# Patient Record
Sex: FEMALE | Race: WHITE | NOT HISPANIC OR LATINO | URBAN - METROPOLITAN AREA
[De-identification: names, ages, dates, MRNs, and addresses within clinical notes are randomized per-mention and may not be internally consistent; named-entity substitution may affect disease eponyms.]

---

## 2017-04-03 ENCOUNTER — OFFICE (AMBULATORY)
Dept: URBAN - METROPOLITAN AREA CLINIC 2 | Facility: CLINIC | Age: 72
End: 2017-04-03

## 2017-04-03 VITALS
HEART RATE: 76 BPM | WEIGHT: 126 LBS | SYSTOLIC BLOOD PRESSURE: 128 MMHG | HEIGHT: 59 IN | DIASTOLIC BLOOD PRESSURE: 90 MMHG

## 2017-04-03 DIAGNOSIS — K59.00 CONSTIPATION, UNSPECIFIED: ICD-10-CM

## 2017-04-03 DIAGNOSIS — K92.1 MELENA: ICD-10-CM

## 2017-04-03 DIAGNOSIS — K64.9 UNSPECIFIED HEMORRHOIDS: ICD-10-CM

## 2017-04-03 DIAGNOSIS — T40.2X5A ADVERSE EFFECT OF OTHER OPIOIDS, INITIAL ENCOUNTER: ICD-10-CM

## 2017-04-03 DIAGNOSIS — R19.4 CHANGE IN BOWEL HABIT: ICD-10-CM

## 2017-04-03 PROCEDURE — 99214 OFFICE O/P EST MOD 30 MIN: CPT

## 2017-04-03 RX ORDER — POLYETHYLENE GLYCOL 3350 17 G/17G
POWDER, FOR SOLUTION ORAL
Qty: 765 | Refills: 11 | Status: ACTIVE

## 2017-05-11 VITALS
RESPIRATION RATE: 17 BRPM | DIASTOLIC BLOOD PRESSURE: 57 MMHG | HEART RATE: 61 BPM | TEMPERATURE: 96.8 F | DIASTOLIC BLOOD PRESSURE: 68 MMHG | HEART RATE: 63 BPM | DIASTOLIC BLOOD PRESSURE: 72 MMHG | SYSTOLIC BLOOD PRESSURE: 119 MMHG | SYSTOLIC BLOOD PRESSURE: 103 MMHG | RESPIRATION RATE: 20 BRPM | SYSTOLIC BLOOD PRESSURE: 153 MMHG | DIASTOLIC BLOOD PRESSURE: 89 MMHG | OXYGEN SATURATION: 99 % | HEART RATE: 69 BPM | SYSTOLIC BLOOD PRESSURE: 136 MMHG | HEIGHT: 59 IN | HEART RATE: 74 BPM | SYSTOLIC BLOOD PRESSURE: 144 MMHG | DIASTOLIC BLOOD PRESSURE: 59 MMHG | RESPIRATION RATE: 14 BRPM | SYSTOLIC BLOOD PRESSURE: 105 MMHG | OXYGEN SATURATION: 98 % | HEART RATE: 64 BPM | RESPIRATION RATE: 23 BRPM | HEART RATE: 82 BPM | TEMPERATURE: 99.1 F | SYSTOLIC BLOOD PRESSURE: 110 MMHG | SYSTOLIC BLOOD PRESSURE: 179 MMHG | WEIGHT: 126 LBS | HEART RATE: 66 BPM | HEART RATE: 85 BPM | DIASTOLIC BLOOD PRESSURE: 76 MMHG | HEART RATE: 65 BPM | SYSTOLIC BLOOD PRESSURE: 115 MMHG | DIASTOLIC BLOOD PRESSURE: 92 MMHG | RESPIRATION RATE: 18 BRPM | OXYGEN SATURATION: 100 %

## 2017-05-12 ENCOUNTER — OFFICE (AMBULATORY)
Dept: URBAN - METROPOLITAN AREA CLINIC 64 | Facility: CLINIC | Age: 72
End: 2017-05-12
Payer: COMMERCIAL

## 2017-05-12 ENCOUNTER — AMBULATORY SURGICAL CENTER (AMBULATORY)
Dept: URBAN - METROPOLITAN AREA SURGERY 17 | Facility: SURGERY | Age: 72
End: 2017-05-12
Payer: COMMERCIAL

## 2017-05-12 DIAGNOSIS — K63.89 OTHER SPECIFIED DISEASES OF INTESTINE: ICD-10-CM

## 2017-05-12 DIAGNOSIS — K59.00 CONSTIPATION, UNSPECIFIED: ICD-10-CM

## 2017-05-12 DIAGNOSIS — R19.7 DIARRHEA, UNSPECIFIED: ICD-10-CM

## 2017-05-12 DIAGNOSIS — K58.9 IRRITABLE BOWEL SYNDROME WITHOUT DIARRHEA: ICD-10-CM

## 2017-05-12 DIAGNOSIS — K64.9 UNSPECIFIED HEMORRHOIDS: ICD-10-CM

## 2017-05-12 DIAGNOSIS — R19.4 CHANGE IN BOWEL HABIT: ICD-10-CM

## 2017-05-12 LAB
GI HISTOLOGY: A. SELECT: (no result)
GI HISTOLOGY: B. SELECT: (no result)
GI HISTOLOGY: PDF REPORT: (no result)

## 2017-05-12 PROCEDURE — 88305 TISSUE EXAM BY PATHOLOGIST: CPT | Performed by: INTERNAL MEDICINE

## 2017-05-12 PROCEDURE — 45380 COLONOSCOPY AND BIOPSY: CPT | Performed by: INTERNAL MEDICINE

## 2017-05-12 RX ADMIN — PROPOFOL 50 MG: 10 INJECTION, EMULSION INTRAVENOUS at 09:59

## 2017-05-12 RX ADMIN — PROPOFOL 100 MG: 10 INJECTION, EMULSION INTRAVENOUS at 09:47

## 2017-05-12 RX ADMIN — PROPOFOL 50 MG: 10 INJECTION, EMULSION INTRAVENOUS at 09:50

## 2017-05-12 RX ADMIN — LIDOCAINE HYDROCHLORIDE 25 MG: 10 INJECTION, SOLUTION EPIDURAL; INFILTRATION; INTRACAUDAL; PERINEURAL at 09:47

## 2017-06-05 ENCOUNTER — OFFICE VISIT (OUTPATIENT)
Dept: CARDIOLOGY | Facility: CLINIC | Age: 72
End: 2017-06-05

## 2017-06-05 VITALS
OXYGEN SATURATION: 95 % | SYSTOLIC BLOOD PRESSURE: 124 MMHG | DIASTOLIC BLOOD PRESSURE: 80 MMHG | BODY MASS INDEX: 25.32 KG/M2 | HEART RATE: 83 BPM | HEIGHT: 60 IN | WEIGHT: 129 LBS

## 2017-06-05 DIAGNOSIS — I25.10 CORONARY ARTERY DISEASE INVOLVING NATIVE CORONARY ARTERY OF NATIVE HEART WITHOUT ANGINA PECTORIS: Primary | ICD-10-CM

## 2017-06-05 PROCEDURE — 99213 OFFICE O/P EST LOW 20 MIN: CPT | Performed by: INTERNAL MEDICINE

## 2017-06-12 NOTE — PROGRESS NOTES
Date of Office Visit: 2017  Encounter Provider: Ganesh Rao MD  Place of Service: [facility]  Patient Name: Anabelle Santiago  :1945    Chief complaint: Follow-up for coronary artery disease.      History of Present Illness:    Dear Dr. Villalta:      I again had the pleasure of seeing your patient in cardiology office on 2017. As  you well know, she is a very pleasant 71 year-old white female with a past history  significant for coronary artery disease, diabetes, and hypertension who presents for  Follow-up. The patient initially saw me on 10/16/2015 with six weeks of increasing  shortness of breath and chest discomfort. She had specifically mentioned working on   her bushes outside and becoming very short of breath, and having to stop several   times. She had also had issues with mowing her yard, even becoming very diaphoretic   at times. Given the severity of her symptoms, she did undergo a left heart   catheterization on 10/22/2015 by Dr. Stafford. This did show borderline coronary artery   disease, including a 50-60% ostial LAD stenosis and a 50% distal left circumflex lesion.   FFR of the distal left circumflex was normal, and medical therapy was recommended.   She subsequently underwent an echocardiogram on 10/26/2015 which revealed an   ejection fraction of 56%, grade I diastolic dysfunction, and no significant valvular disease.   A CT angiogram of her chest was performed on 2015 which also was essentially   normal with no pulmonary embolism, aortic pathology, or lung findings. Ultimately, it was   felt that intermittent hypotension may have been the cause of some of her symptoms,   and her enalapril was decreased, and her amlodipine was discontinued. She   subsequently felt better. She had been having readings at home with blood pressures   as low as 84/48 at times.       The patient presents today for follow-up.  Overall, she has been doing  fairly well.  She   still gets short of breath with stairs or going out in the heat.  However, this has been her   baseline.  She has not had any recent chest pain.  Her blood pressure has actually been   good, and is 124/80 today.  She has had no syncope or presyncope.    Past Medical History:   Diagnosis Date   • Asthma    • CAD (coronary artery disease)     Cath on 10/22/15 by Dr. Stafford: Left main normal.  LAD with 50-60% ostial disease and 30% distal.  LCx large with 30% ostial and 50% distal disease.  OM1 30% proximal.  RCA dominant and normal.  FFR of distal left circumflex was normal.   • Diabetes mellitus    • Hyperlipidemia    • Hypertension    • YAMILETH (obstructive sleep apnea)     Intolerant to CPAP   • Osteoarthritis    • Recurrent UTI    • Vasculitis     Diagnosed at [facility] - history of vascultits of small vessels of hands and feet       Past Surgical History:   Procedure Laterality Date   • APPENDECTOMY     • BLADDER SURGERY      repair   • HYSTERECTOMY     • TONSILLECTOMY     • TOTAL HIP ARTHROPLASTY Bilateral        Current Outpatient Prescriptions on File Prior to Visit   Medication Sig Dispense Refill   • albuterol (PROVENTIL HFA;VENTOLIN HFA) 108 (90 BASE) MCG/ACT inhaler Albuterol 90 MCG/ACT AERS; Patient Sig: Albuterol 90 MCG/ACT AERS ; 0; 15-Oct-2015; Active     • albuterol (PROVENTIL HFA;VENTOLIN HFA) 108 (90 BASE) MCG/ACT inhaler ProAir  (90 Base) MCG/ACT Inhalation Aerosol Solution; Patient Sig: ProAir  (90 Base) MCG/ACT Inhalation Aerosol Solution ; 8; 0; 16-Sep-2015; Active     • alendronate (FOSAMAX) 70 MG tablet TAKE 1 TABLET BY MOUTH EVERY 7 DAYS WITH 80Z OF WATER, ON EMPTY STOMACH  2   • aspirin 81 MG EC tablet Take 81 mg by mouth daily.     • calcium carbonate (OS-LISSY) 600 MG tablet Take 600 mg by mouth daily.     • cetirizine (ZyrTEC) 10 MG tablet Take 10 mg by mouth daily.     • dicyclomine (BENTYL) 10 MG capsule TAKE 1 CAPSULE BY MOUTH 4 (FOUR) TIMES DAILY BEFORE  "MEALS AND NIGHTLY  5   • enalapril (VASOTEC) 20 MG tablet TAKE 1 TABLET BY MOUTH DAILY  1   • fluticasone (FLONASE) 50 MCG/ACT nasal spray into each nostril.     • gabapentin (NEURONTIN) 800 MG tablet      • meloxicam (MOBIC) 15 MG tablet TAKE 1 TABLET BY MOUTH DAILY AS NEEDED FOR PAIN  1   • oxyCODONE-acetaminophen (PERCOCET)  MG per tablet TAKE 1 TABLET BY MOUTH EVERY 4 HOURS  0   • simvastatin (ZOCOR) 80 MG tablet TAKE 1 TABLET BY MOUTH NIGHTLY  1   • VITAMIN D, CHOLECALCIFEROL, PO Take  by mouth.       No current facility-administered medications on file prior to visit.      Allergies as of 06/05/2017   • (No Known Allergies)     Social History     Social History   • Marital status:      Spouse name: N/A   • Number of children: N/A   • Years of education: N/A     Occupational History   • Not on file.     Social History Main Topics   • Smoking status: Passive Smoke Exposure - Never Smoker   • Smokeless tobacco: Never Used      Comment: 23 years of secondhand smoke exposure   • Alcohol use No   • Drug use: No   • Sexual activity: Not on file     Other Topics Concern   • Not on file     Social History Narrative     Family History   Problem Relation Age of Onset   • Stroke Mother      CVA   • Leukemia Father    • Coronary artery disease Brother      MI at 41   • Cancer Maternal Grandmother      colon cancer       Review of Systems   All other systems reviewed and are negative.    Objective:     Vitals:    06/05/17 1127   BP: 124/80   Pulse: 83   SpO2: 95%   Weight: 129 lb (58.5 kg)   Height: 59.5\" (151.1 cm)     Body mass index is 25.62 kg/(m^2).    Physical Exam   Constitutional: She is oriented to person, place, and time. She appears well-developed and well-nourished.   HENT:   Head: Normocephalic and atraumatic.   Eyes: Conjunctivae are normal.   Neck: Neck supple.   Cardiovascular: Normal rate and regular rhythm.  Exam reveals no gallop and no friction rub.    Murmur heard.   Systolic murmur is " present with a grade of 2/6  at the upper right sternal border, lower left sternal border  Pulmonary/Chest: Effort normal and breath sounds normal.   Abdominal: Soft. There is no tenderness.   Musculoskeletal: She exhibits no edema.   Neurological: She is alert and oriented to person, place, and time.   Skin: Skin is warm.   Psychiatric: She has a normal mood and affect. Her behavior is normal.     Lab Review:   Procedures    Cardiac Procedures:  1. Left heart catheterization on 10/22/2015 by Dr. Stafford revealed the patient's left main  to be angiographically normal. The left circumflex was a large vessel with a 30% ostial  stenosis and a 50% distal stenosis. The first obtuse marginal branch had a 30% proximal  stenosis. The LAD was large and had an ostial 50-60% stenosis followed by a 30% distal  stenosis. The right coronary artery was dominant and normal. FFR of the distal left  circumflex was normal.   2. Echocardiogram on 10/26/2015 revealed an ejection fraction of 56%. There was grade I  diastolic dysfunction. The right ventricle was normal in size and function. There was no  significant valvular disease.   3. CT angiogram of the chest on 11/02/2015 revealed no evidence of pulmonary embolus   or aortic pathology.      Assessment:       Diagnosis Plan   1. Coronary artery disease involving native coronary artery of native heart without angina pectoris       Plan:       Overall, she seems to be doing well.  She still has baseline shortness of breath with   elevated temperatures outside or going up steps, although this has not changed   significantly.  She has not had any significant chest pain recently.  Her blood pressure   has been doing well, and hypotension in the past was the cause for many of her   symptoms.  She is currently only on and now a parole for her blood pressure.  She   will continue on the aspirin and Zocor for her coronary artery disease.  I did not   change any of her medications today.  I will  plan on seeing her back in the office in   approximately 8 months unless other issues arise.    Coronary Artery Disease  Assessment  • The patient has no angina    Plan  • Lifestyle modifications discussed include adhering to a heart healthy diet, avoidance of tobacco products, maintenance of a healthy weight, medication compliance, regular exercise and regular monitoring of cholesterol and blood pressure    Subjective - Objective  • Current antiplatelet therapy includes aspirin 81 mg

## 2017-06-14 ENCOUNTER — OFFICE (AMBULATORY)
Dept: URBAN - METROPOLITAN AREA CLINIC 75 | Facility: CLINIC | Age: 72
End: 2017-06-14

## 2017-06-14 VITALS
HEART RATE: 78 BPM | WEIGHT: 132 LBS | DIASTOLIC BLOOD PRESSURE: 62 MMHG | SYSTOLIC BLOOD PRESSURE: 102 MMHG | HEIGHT: 59 IN

## 2017-06-14 DIAGNOSIS — K64.9 UNSPECIFIED HEMORRHOIDS: ICD-10-CM

## 2017-06-14 DIAGNOSIS — R15.9 FULL INCONTINENCE OF FECES: ICD-10-CM

## 2017-06-14 DIAGNOSIS — R19.4 CHANGE IN BOWEL HABIT: ICD-10-CM

## 2017-06-14 DIAGNOSIS — K58.9 IRRITABLE BOWEL SYNDROME WITHOUT DIARRHEA: ICD-10-CM

## 2017-06-14 DIAGNOSIS — K59.00 CONSTIPATION, UNSPECIFIED: ICD-10-CM

## 2017-06-14 DIAGNOSIS — R14.0 ABDOMINAL DISTENSION (GASEOUS): ICD-10-CM

## 2017-06-14 PROCEDURE — 99213 OFFICE O/P EST LOW 20 MIN: CPT

## 2018-02-21 ENCOUNTER — OFFICE VISIT (OUTPATIENT)
Dept: CARDIOLOGY | Facility: CLINIC | Age: 73
End: 2018-02-21

## 2018-02-21 VITALS
BODY MASS INDEX: 28.22 KG/M2 | SYSTOLIC BLOOD PRESSURE: 132 MMHG | HEIGHT: 59 IN | DIASTOLIC BLOOD PRESSURE: 80 MMHG | WEIGHT: 140 LBS | HEART RATE: 71 BPM

## 2018-02-21 DIAGNOSIS — I25.119 CORONARY ARTERY DISEASE INVOLVING NATIVE CORONARY ARTERY OF NATIVE HEART WITH ANGINA PECTORIS (HCC): Primary | ICD-10-CM

## 2018-02-21 DIAGNOSIS — R06.02 SHORTNESS OF BREATH: ICD-10-CM

## 2018-02-21 PROCEDURE — 99214 OFFICE O/P EST MOD 30 MIN: CPT | Performed by: INTERNAL MEDICINE

## 2018-02-21 RX ORDER — ESCITALOPRAM OXALATE 10 MG/1
10 TABLET ORAL
COMMUNITY
Start: 2018-01-22 | End: 2018-09-26 | Stop reason: ALTCHOICE

## 2018-02-21 NOTE — PROGRESS NOTES
Date of Office Visit: 2018  Encounter Provider: Ganesh Rao MD  Place of Service: [facility]  Patient Name: Anabelle Santiago  :1945    Chief complaint: Follow-up for coronary artery disease.  Increasing shortness   of breath.    History of Present Illness:    Dear Dr. Villalta:      I again had the pleasure of seeing your patient in cardiology office on 2018. As  you well know, she is a very pleasant 72 year-old white female with a past history  significant for coronary artery disease, diabetes, and hypertension who presents for  Follow-up. The patient initially saw me on 10/16/2015 with six weeks of increasing  shortness of breath and chest discomfort. She had specifically mentioned working on   her bushes outside and becoming very short of breath, and having to stop several   times. She had also had issues with mowing her yard, even becoming very diaphoretic   at times. Given the severity of her symptoms, she did undergo a left heart   catheterization on 10/22/2015 by Dr. Stafford. This did show borderline coronary artery   disease, including a 50-60% ostial LAD stenosis and a 50% distal left circumflex lesion.   FFR of the distal left circumflex was normal, and medical therapy was recommended.   She subsequently underwent an echocardiogram on 10/26/2015 which revealed an   ejection fraction of 56%, grade I diastolic dysfunction, and no significant valvular disease.   A CT angiogram of her chest was performed on 2015 which also was essentially   normal with no pulmonary embolism, aortic pathology, or lung findings. Ultimately, it was   felt that intermittent hypotension may have been the cause of some of her symptoms,   and her enalapril was decreased, and her amlodipine was discontinued. She   subsequently felt better. She had been having readings at home with blood pressures   as low as 84/48 at times.       The patient presents today for  follow-up.  She was recently diagnosed with severe   obstructive sleep apnea after she had several episodes of gasping for breath during the  night.  She does have a CPAP machine now.  She has been getting more short of   breath with exertion, especially with moderate exertion or more.  She states that activity   such as carrying groceries, going up steps, or walking at a brisk pace are causing her   to stop and catch her breath.  She was recently placed on Advair, and was told to use   her albuterol inhaler twice a day.  She has not had any chest pain.    Past Medical History:   Diagnosis Date   • Asthma    • CAD (coronary artery disease)     Cath on 10/22/15 by Dr. Stafford: Left main normal.  LAD with 50-60% ostial disease and 30% distal.  LCx large with 30% ostial and 50% distal disease.  OM1 30% proximal.  RCA dominant and normal.  FFR of distal left circumflex was normal.   • Diabetes mellitus    • Hyperlipidemia    • Hypertension    • YAMILETH (obstructive sleep apnea)     on CPAP   • Osteoarthritis    • Recurrent UTI    • Vasculitis     Diagnosed at [facility] - history of vascultits of small vessels of hands and feet       Past Surgical History:   Procedure Laterality Date   • APPENDECTOMY     • BLADDER SURGERY      repair   • HYSTERECTOMY     • TONSILLECTOMY     • TOTAL HIP ARTHROPLASTY Bilateral        Current Outpatient Prescriptions on File Prior to Visit   Medication Sig Dispense Refill   • albuterol (PROVENTIL HFA;VENTOLIN HFA) 108 (90 BASE) MCG/ACT inhaler Albuterol 90 MCG/ACT AERS; Patient Sig: Albuterol 90 MCG/ACT AERS ; 0; 15-Oct-2015; Active     • albuterol (PROVENTIL HFA;VENTOLIN HFA) 108 (90 BASE) MCG/ACT inhaler ProAir  (90 Base) MCG/ACT Inhalation Aerosol Solution; Patient Sig: ProAir  (90 Base) MCG/ACT Inhalation Aerosol Solution ; 8; 0; 16-Sep-2015; Active     • alendronate (FOSAMAX) 70 MG tablet TAKE 1 TABLET BY MOUTH EVERY 7 DAYS WITH 80Z OF WATER, ON EMPTY STOMACH  2   • aspirin  81 MG EC tablet Take 81 mg by mouth daily.     • calcium carbonate (OS-LISSY) 600 MG tablet Take 600 mg by mouth daily.     • cetirizine (ZyrTEC) 10 MG tablet Take 10 mg by mouth daily.     • dicyclomine (BENTYL) 10 MG capsule TAKE 1 CAPSULE BY MOUTH 4 (FOUR) TIMES DAILY BEFORE MEALS AND NIGHTLY  5   • enalapril (VASOTEC) 20 MG tablet TAKE 1 TABLET BY MOUTH DAILY  1   • fluticasone (FLONASE) 50 MCG/ACT nasal spray into each nostril.     • gabapentin (NEURONTIN) 800 MG tablet      • meloxicam (MOBIC) 15 MG tablet TAKE 1 TABLET BY MOUTH DAILY AS NEEDED FOR PAIN  1   • oxyCODONE-acetaminophen (PERCOCET)  MG per tablet TAKE 1 TABLET BY MOUTH EVERY 4 HOURS  0   • Polyethylene Glycol 3350 (MIRALAX PO) Take  by mouth.     • simvastatin (ZOCOR) 80 MG tablet TAKE 1 TABLET BY MOUTH NIGHTLY  1   • VITAMIN D, CHOLECALCIFEROL, PO Take  by mouth.       No current facility-administered medications on file prior to visit.      Allergies as of 02/21/2018   • (No Known Allergies)     Social History     Social History   • Marital status:      Spouse name: N/A   • Number of children: N/A   • Years of education: N/A     Occupational History   • Not on file.     Social History Main Topics   • Smoking status: Passive Smoke Exposure - Never Smoker   • Smokeless tobacco: Never Used      Comment: 23 years of secondhand smoke exposure   • Alcohol use No   • Drug use: No   • Sexual activity: Not on file     Other Topics Concern   • Not on file     Social History Narrative     Family History   Problem Relation Age of Onset   • Stroke Mother      CVA   • Leukemia Father    • Coronary artery disease Brother      MI at 41   • Cancer Maternal Grandmother      colon cancer       Review of Systems   Constitution: Positive for malaise/fatigue.   Cardiovascular: Positive for dyspnea on exertion.   Neurological: Positive for light-headedness.   All other systems reviewed and are negative.    Objective:     Vitals:    02/21/18 1408   BP:  "132/80   Pulse: 71   Weight: 63.5 kg (140 lb)   Height: 151.1 cm (59.49\")     Body mass index is 27.81 kg/(m^2).    Physical Exam   Constitutional: She is oriented to person, place, and time. She appears well-developed and well-nourished.   HENT:   Head: Normocephalic and atraumatic.   Eyes: Conjunctivae are normal.   Neck: Neck supple.   Cardiovascular: Normal rate and regular rhythm.  Exam reveals no gallop and no friction rub.    Murmur heard.   Systolic murmur is present with a grade of 2/6  at the upper right sternal border, upper left sternal border  Pulmonary/Chest: Effort normal and breath sounds normal.   Abdominal: Soft. There is no tenderness.   Musculoskeletal: She exhibits no edema.   Neurological: She is alert and oriented to person, place, and time.   Skin: Skin is warm.   Psychiatric: She has a normal mood and affect. Her behavior is normal.     Lab Review:   Procedures    Cardiac Procedures:  1. Left heart catheterization on 10/22/2015 by Dr. Stafford revealed the patient's left main  to be angiographically normal. The left circumflex was a large vessel with a 30% ostial  stenosis and a 50% distal stenosis. The first obtuse marginal branch had a 30% proximal  stenosis. The LAD was large and had an ostial 50-60% stenosis followed by a 30% distal  stenosis. The right coronary artery was dominant and normal. FFR of the distal left  circumflex was normal.   2. Echocardiogram on 10/26/2015 revealed an ejection fraction of 56%. There was grade I  diastolic dysfunction. The right ventricle was normal in size and function. There was no  significant valvular disease.   3. CT angiogram of the chest on 11/02/2015 revealed no evidence of pulmonary embolus   or aortic pathology.    Assessment:       Diagnosis Plan   1. Coronary artery disease involving native coronary artery of native heart with angina pectoris  Adult Transthoracic Echo Complete W/ Cont if Necessary Per Protocol    Stress Test With Myocardial " Perfusion One Day   2. Shortness of breath  Adult Transthoracic Echo Complete W/ Cont if Necessary Per Protocol    Stress Test With Myocardial Perfusion One Day     Plan:       Again, the patient's main complaint is increasing dyspnea on exertion.  She has been short of   breath for quite some time, although she states that it has been worse recently, especially   with moderate activity or more.  She was recently placed on a CPAP machine for severe   obstructive sleep apnea.  Her now referral was also recently decreased to 10 mg per day for   recurrent hypotension.  She has not had chest discomfort.  However, she does have a history   of diabetes, and had a 50% distal left circumflex stenosis, as well as a 50-60% ostial LAD   stenosis on her heart catheterization on 10/22/2015.  For now, I have recommended   proceeding with an echocardiogram at this point.  She has not had structural heart disease or   cardiomyopathy in the past.  I would also recommend checking a Lexiscan Myoview stress   test at this point to evaluate for any potential ischemia, especially in the anterior lateral walls.    She will continue on aspirin and simvastatin.  Her enalapril is currently at 10 mg per day as   she had recurrent hypotension with the 20 mg dose.  She is not on beta blockers secondary   to her blood pressure.  Further plans will be made pending the results the above tests.    Otherwise, I will see her back in the office in 6 months.    Coronary Artery Disease  Assessment  • The patient has CCS class II - angina only during vigorous physical activity    Plan  • Lifestyle modifications discussed include adhering to a heart healthy diet, avoidance of tobacco products, maintenance of a healthy weight, medication compliance, regular exercise and regular monitoring of cholesterol and blood pressure    Subjective - Objective  • Current antiplatelet therapy includes aspirin 81 mg

## 2018-03-09 ENCOUNTER — HOSPITAL ENCOUNTER (OUTPATIENT)
Dept: CARDIOLOGY | Facility: HOSPITAL | Age: 73
Discharge: HOME | End: 2018-03-09
Attending: INTERNAL MEDICINE | Admitting: INTERNAL MEDICINE

## 2018-03-09 ENCOUNTER — HOSPITAL ENCOUNTER (OUTPATIENT)
Dept: CARDIOLOGY | Facility: HOSPITAL | Age: 73
Discharge: HOME | End: 2018-03-09
Attending: INTERNAL MEDICINE

## 2018-03-09 VITALS
DIASTOLIC BLOOD PRESSURE: 80 MMHG | HEIGHT: 59 IN | BODY MASS INDEX: 28.22 KG/M2 | SYSTOLIC BLOOD PRESSURE: 132 MMHG | WEIGHT: 140 LBS | HEART RATE: 76 BPM

## 2018-03-09 DIAGNOSIS — R06.02 SHORTNESS OF BREATH: ICD-10-CM

## 2018-03-09 DIAGNOSIS — R06.02 SHORTNESS OF BREATH: Primary | ICD-10-CM

## 2018-03-09 DIAGNOSIS — I25.119 CORONARY ARTERY DISEASE INVOLVING NATIVE CORONARY ARTERY OF NATIVE HEART WITH ANGINA PECTORIS (HCC): ICD-10-CM

## 2018-03-09 DIAGNOSIS — J45.909 ASTHMA, UNSPECIFIED ASTHMA SEVERITY, UNSPECIFIED WHETHER COMPLICATED, UNSPECIFIED WHETHER PERSISTENT: ICD-10-CM

## 2018-03-09 LAB
ASCENDING AORTA: 2.4 CM
BH CV ECHO MEAS - ACS: 1.5 CM
BH CV ECHO MEAS - AO MAX PG (FULL): 6 MMHG
BH CV ECHO MEAS - AO MAX PG: 9.5 MMHG
BH CV ECHO MEAS - AO MEAN PG (FULL): 3.9 MMHG
BH CV ECHO MEAS - AO MEAN PG: 5.9 MMHG
BH CV ECHO MEAS - AO ROOT AREA (BSA CORRECTED): 1.7
BH CV ECHO MEAS - AO ROOT AREA: 5.7 CM^2
BH CV ECHO MEAS - AO ROOT DIAM: 2.7 CM
BH CV ECHO MEAS - AO V2 MAX: 153.8 CM/SEC
BH CV ECHO MEAS - AO V2 MEAN: 109.6 CM/SEC
BH CV ECHO MEAS - AO V2 VTI: 35.1 CM
BH CV ECHO MEAS - ASC AORTA: 2.4 CM
BH CV ECHO MEAS - AVA(I,A): 1.6 CM^2
BH CV ECHO MEAS - AVA(I,D): 1.6 CM^2
BH CV ECHO MEAS - AVA(V,A): 1.5 CM^2
BH CV ECHO MEAS - AVA(V,D): 1.5 CM^2
BH CV ECHO MEAS - BSA(HAYCOCK): 1.6 M^2
BH CV ECHO MEAS - BSA: 1.6 M^2
BH CV ECHO MEAS - BZI_BMI: 28.3 KILOGRAMS/M^2
BH CV ECHO MEAS - BZI_METRIC_HEIGHT: 149.9 CM
BH CV ECHO MEAS - BZI_METRIC_WEIGHT: 63.5 KG
BH CV ECHO MEAS - CONTRAST EF (2CH): 57.4 ML/M^2
BH CV ECHO MEAS - CONTRAST EF 4CH: 54.5 ML/M^2
BH CV ECHO MEAS - EDV(MOD-SP2): 54 ML
BH CV ECHO MEAS - EDV(MOD-SP4): 55 ML
BH CV ECHO MEAS - EDV(TEICH): 71.4 ML
BH CV ECHO MEAS - EF(CUBED): 72.8 %
BH CV ECHO MEAS - EF(MOD-SP2): 57.4 %
BH CV ECHO MEAS - EF(MOD-SP4): 56 %
BH CV ECHO MEAS - EF(TEICH): 65.1 %
BH CV ECHO MEAS - ESV(MOD-SP2): 23 ML
BH CV ECHO MEAS - ESV(MOD-SP4): 25 ML
BH CV ECHO MEAS - ESV(TEICH): 24.9 ML
BH CV ECHO MEAS - FS: 35.2 %
BH CV ECHO MEAS - IVS/LVPW: 1
BH CV ECHO MEAS - IVSD: 1 CM
BH CV ECHO MEAS - LAT PEAK E' VEL: 8 CM/SEC
BH CV ECHO MEAS - LV DIASTOLIC VOL/BSA (35-75): 34.7 ML/M^2
BH CV ECHO MEAS - LV MASS(C)D: 125 GRAMS
BH CV ECHO MEAS - LV MASS(C)DI: 78.9 GRAMS/M^2
BH CV ECHO MEAS - LV MAX PG: 3.4 MMHG
BH CV ECHO MEAS - LV MEAN PG: 2 MMHG
BH CV ECHO MEAS - LV SYSTOLIC VOL/BSA (12-30): 15.8 ML/M^2
BH CV ECHO MEAS - LV V1 MAX: 92.6 CM/SEC
BH CV ECHO MEAS - LV V1 MEAN: 68.4 CM/SEC
BH CV ECHO MEAS - LV V1 VTI: 22.4 CM
BH CV ECHO MEAS - LVIDD: 4 CM
BH CV ECHO MEAS - LVIDS: 2.6 CM
BH CV ECHO MEAS - LVLD AP2: 6.5 CM
BH CV ECHO MEAS - LVLD AP4: 6.2 CM
BH CV ECHO MEAS - LVLS AP2: 6 CM
BH CV ECHO MEAS - LVLS AP4: 5.3 CM
BH CV ECHO MEAS - LVOT AREA (M): 2.5 CM^2
BH CV ECHO MEAS - LVOT AREA: 2.4 CM^2
BH CV ECHO MEAS - LVOT DIAM: 1.8 CM
BH CV ECHO MEAS - LVPWD: 0.96 CM
BH CV ECHO MEAS - MED PEAK E' VEL: 6 CM/SEC
BH CV ECHO MEAS - MR MAX PG: 82.2 MMHG
BH CV ECHO MEAS - MR MAX VEL: 453.4 CM/SEC
BH CV ECHO MEAS - MV A DUR: 0.12 SEC
BH CV ECHO MEAS - MV A MAX VEL: 108.7 CM/SEC
BH CV ECHO MEAS - MV DEC SLOPE: 281 CM/SEC^2
BH CV ECHO MEAS - MV DEC TIME: 0.26 SEC
BH CV ECHO MEAS - MV E MAX VEL: 69.4 CM/SEC
BH CV ECHO MEAS - MV E/A: 0.64
BH CV ECHO MEAS - MV MAX PG: 6.2 MMHG
BH CV ECHO MEAS - MV MEAN PG: 2 MMHG
BH CV ECHO MEAS - MV P1/2T MAX VEL: 76.2 CM/SEC
BH CV ECHO MEAS - MV P1/2T: 79.4 MSEC
BH CV ECHO MEAS - MV V2 MAX: 124.9 CM/SEC
BH CV ECHO MEAS - MV V2 MEAN: 63.6 CM/SEC
BH CV ECHO MEAS - MV V2 VTI: 31.6 CM
BH CV ECHO MEAS - MVA P1/2T LCG: 2.9 CM^2
BH CV ECHO MEAS - MVA(P1/2T): 2.8 CM^2
BH CV ECHO MEAS - MVA(VTI): 1.7 CM^2
BH CV ECHO MEAS - PA ACC TIME: 0.14 SEC
BH CV ECHO MEAS - PA MAX PG (FULL): 1.8 MMHG
BH CV ECHO MEAS - PA MAX PG: 3.3 MMHG
BH CV ECHO MEAS - PA PR(ACCEL): 15.6 MMHG
BH CV ECHO MEAS - PA V2 MAX: 90.9 CM/SEC
BH CV ECHO MEAS - PULM A REVS DUR: 0.11 SEC
BH CV ECHO MEAS - PULM A REVS VEL: 31 CM/SEC
BH CV ECHO MEAS - PULM DIAS VEL: 33.5 CM/SEC
BH CV ECHO MEAS - PULM S/D: 1.7
BH CV ECHO MEAS - PULM SYS VEL: 55.3 CM/SEC
BH CV ECHO MEAS - PVA(V,A): 1.8 CM^2
BH CV ECHO MEAS - PVA(V,D): 1.8 CM^2
BH CV ECHO MEAS - QP/QS: 0.65
BH CV ECHO MEAS - RV MAX PG: 1.5 MMHG
BH CV ECHO MEAS - RV MEAN PG: 0.71 MMHG
BH CV ECHO MEAS - RV V1 MAX: 61.1 CM/SEC
BH CV ECHO MEAS - RV V1 MEAN: 37.7 CM/SEC
BH CV ECHO MEAS - RV V1 VTI: 13.3 CM
BH CV ECHO MEAS - RVOT AREA: 2.7 CM^2
BH CV ECHO MEAS - RVOT DIAM: 1.8 CM
BH CV ECHO MEAS - SI(AO): 127.3 ML/M^2
BH CV ECHO MEAS - SI(CUBED): 30.2 ML/M^2
BH CV ECHO MEAS - SI(LVOT): 34.5 ML/M^2
BH CV ECHO MEAS - SI(MOD-SP2): 19.6 ML/M^2
BH CV ECHO MEAS - SI(MOD-SP4): 18.9 ML/M^2
BH CV ECHO MEAS - SI(TEICH): 29.3 ML/M^2
BH CV ECHO MEAS - SUP REN AO DIAM: 1.4 CM
BH CV ECHO MEAS - SV(AO): 201.7 ML
BH CV ECHO MEAS - SV(CUBED): 47.8 ML
BH CV ECHO MEAS - SV(LVOT): 54.7 ML
BH CV ECHO MEAS - SV(MOD-SP2): 31 ML
BH CV ECHO MEAS - SV(MOD-SP4): 30 ML
BH CV ECHO MEAS - SV(RVOT): 35.3 ML
BH CV ECHO MEAS - SV(TEICH): 46.5 ML
BH CV ECHO MEAS - TAPSE (>1.6): 2.4 CM2
BH CV NUCLEAR PRIOR STUDY: 2
BH CV STRESS BP STAGE 1: NORMAL
BH CV STRESS COMMENTS STAGE 1: NORMAL
BH CV STRESS DOSE REGADENOSON STAGE 1: 0.4
BH CV STRESS DURATION MIN STAGE 1: 0
BH CV STRESS DURATION SEC STAGE 1: 10
BH CV STRESS HR STAGE 1: 115
BH CV STRESS PROTOCOL 1: NORMAL
BH CV STRESS RECOVERY BP: NORMAL MMHG
BH CV STRESS RECOVERY HR: 100 BPM
BH CV STRESS STAGE 1: 1
BH CV XLRA - RV BASE: 2.2 CM
BH CV XLRA - TDI S': 10 CM/SEC
E/E' RATIO: 11
LEFT ATRIUM VOLUME INDEX: 18 ML/M2
LV EF 2D ECHO EST: 56 %
LV EF NUC BP: 65 %
MAXIMAL PREDICTED HEART RATE: 150 BPM
MAXIMAL PREDICTED HEART RATE: 150 BPM
PERCENT MAX PREDICTED HR: 76.67 %
SINUS: 2.5 CM
STJ: 2.2 CM
STRESS BASELINE BP: NORMAL MMHG
STRESS BASELINE HR: 68 BPM
STRESS PERCENT HR: 90 %
STRESS POST EXERCISE DUR SEC: 10 SEC
STRESS POST PEAK BP: NORMAL MMHG
STRESS POST PEAK HR: 115 BPM
STRESS TARGET HR: 128 BPM
STRESS TARGET HR: 128 BPM

## 2018-03-09 PROCEDURE — 93018 CV STRESS TEST I&R ONLY: CPT | Performed by: INTERNAL MEDICINE

## 2018-03-09 PROCEDURE — 78492 MYOCRD IMG PET MLT RST&STRS: CPT

## 2018-03-09 PROCEDURE — 78492 MYOCRD IMG PET MLT RST&STRS: CPT | Performed by: INTERNAL MEDICINE

## 2018-03-09 PROCEDURE — 93306 TTE W/DOPPLER COMPLETE: CPT

## 2018-03-09 PROCEDURE — 0 RUBIDIUM CHLORIDE: Performed by: INTERNAL MEDICINE

## 2018-03-09 PROCEDURE — 93016 CV STRESS TEST SUPVJ ONLY: CPT | Performed by: INTERNAL MEDICINE

## 2018-03-09 PROCEDURE — A9555 RB82 RUBIDIUM: HCPCS | Performed by: INTERNAL MEDICINE

## 2018-03-09 PROCEDURE — 93017 CV STRESS TEST TRACING ONLY: CPT

## 2018-03-09 PROCEDURE — 25010000002 REGADENOSON 0.4 MG/5ML SOLUTION: Performed by: INTERNAL MEDICINE

## 2018-03-09 PROCEDURE — 93306 TTE W/DOPPLER COMPLETE: CPT | Performed by: INTERNAL MEDICINE

## 2018-03-09 RX ADMIN — REGADENOSON 0.4 MG: 0.08 INJECTION, SOLUTION INTRAVENOUS at 12:10

## 2018-03-09 RX ADMIN — RUBIDIUM CHLORIDE RB-82 1 DOSE: 150 INJECTION, SOLUTION INTRAVENOUS at 12:10

## 2018-03-09 RX ADMIN — RUBIDIUM CHLORIDE RB-82 1 DOSE: 150 INJECTION, SOLUTION INTRAVENOUS at 12:00

## 2018-08-24 ENCOUNTER — TRANSCRIBE ORDERS (OUTPATIENT)
Dept: ADMINISTRATIVE | Facility: HOSPITAL | Age: 73
End: 2018-08-24

## 2018-08-24 DIAGNOSIS — G89.29 CHRONIC RIGHT SI JOINT PAIN: Primary | ICD-10-CM

## 2018-08-24 DIAGNOSIS — M53.3 CHRONIC RIGHT SI JOINT PAIN: Primary | ICD-10-CM

## 2018-08-31 ENCOUNTER — HOSPITAL ENCOUNTER (OUTPATIENT)
Dept: CT IMAGING | Facility: HOSPITAL | Age: 73
Discharge: HOME | End: 2018-08-31
Attending: ORTHOPAEDIC SURGERY | Admitting: ORTHOPAEDIC SURGERY

## 2018-08-31 DIAGNOSIS — M53.3 CHRONIC RIGHT SI JOINT PAIN: ICD-10-CM

## 2018-08-31 DIAGNOSIS — G89.29 CHRONIC RIGHT SI JOINT PAIN: ICD-10-CM

## 2018-08-31 PROCEDURE — 25010000002 METHYLPREDNISOLONE PER 40 MG: Performed by: RADIOLOGY

## 2018-08-31 RX ORDER — BUPIVACAINE HYDROCHLORIDE 5 MG/ML
3 INJECTION, SOLUTION EPIDURAL; INTRACAUDAL ONCE
Status: COMPLETED | OUTPATIENT
Start: 2018-08-31 | End: 2018-08-31

## 2018-08-31 RX ORDER — LIDOCAINE HYDROCHLORIDE 10 MG/ML
20 INJECTION, SOLUTION INFILTRATION; PERINEURAL ONCE
Status: COMPLETED | OUTPATIENT
Start: 2018-08-31 | End: 2018-08-31

## 2018-08-31 RX ORDER — METHYLPREDNISOLONE ACETATE 40 MG/ML
40 INJECTION, SUSPENSION INTRA-ARTICULAR; INTRALESIONAL; INTRAMUSCULAR; SOFT TISSUE ONCE
Status: COMPLETED | OUTPATIENT
Start: 2018-08-31 | End: 2018-08-31

## 2018-08-31 RX ADMIN — METHYLPREDNISOLONE ACETATE 40 MG: 40 INJECTION, SUSPENSION INTRA-ARTICULAR; INTRALESIONAL; INTRAMUSCULAR; SOFT TISSUE at 13:23

## 2018-08-31 RX ADMIN — LIDOCAINE HYDROCHLORIDE 20 ML: 10 INJECTION, SOLUTION INFILTRATION; PERINEURAL at 13:23

## 2018-08-31 RX ADMIN — BUPIVACAINE HYDROCHLORIDE 3 ML: 5 INJECTION, SOLUTION EPIDURAL; INTRACAUDAL; PERINEURAL at 13:23

## 2018-09-26 ENCOUNTER — OFFICE VISIT (OUTPATIENT)
Dept: CARDIOLOGY | Facility: CLINIC | Age: 73
End: 2018-09-26

## 2018-09-26 VITALS
HEIGHT: 59 IN | WEIGHT: 141 LBS | SYSTOLIC BLOOD PRESSURE: 128 MMHG | HEART RATE: 68 BPM | DIASTOLIC BLOOD PRESSURE: 76 MMHG | BODY MASS INDEX: 28.43 KG/M2

## 2018-09-26 DIAGNOSIS — I10 ESSENTIAL HYPERTENSION: ICD-10-CM

## 2018-09-26 DIAGNOSIS — R73.03 PREDIABETES: ICD-10-CM

## 2018-09-26 DIAGNOSIS — I25.10 CORONARY ARTERY DISEASE INVOLVING NATIVE CORONARY ARTERY OF NATIVE HEART WITHOUT ANGINA PECTORIS: Primary | ICD-10-CM

## 2018-09-26 DIAGNOSIS — J45.909 PERSISTENT ASTHMA, UNSPECIFIED ASTHMA SEVERITY, UNSPECIFIED WHETHER COMPLICATED: ICD-10-CM

## 2018-09-26 DIAGNOSIS — E78.2 MIXED HYPERLIPIDEMIA: ICD-10-CM

## 2018-09-26 PROBLEM — J44.9 COPD (CHRONIC OBSTRUCTIVE PULMONARY DISEASE) (HCC): Status: ACTIVE | Noted: 2018-09-26

## 2018-09-26 PROBLEM — IMO0002 PERSISTENT ASTHMA: Status: ACTIVE | Noted: 2018-09-26

## 2018-09-26 PROCEDURE — 93000 ELECTROCARDIOGRAM COMPLETE: CPT | Performed by: NURSE PRACTITIONER

## 2018-09-26 PROCEDURE — 99214 OFFICE O/P EST MOD 30 MIN: CPT | Performed by: NURSE PRACTITIONER

## 2018-09-26 RX ORDER — OMEPRAZOLE 40 MG/1
40 CAPSULE, DELAYED RELEASE ORAL DAILY
COMMUNITY
Start: 2018-05-14 | End: 2019-03-25

## 2018-09-26 RX ORDER — BUDESONIDE AND FORMOTEROL FUMARATE DIHYDRATE 160; 4.5 UG/1; UG/1
2 AEROSOL RESPIRATORY (INHALATION) DAILY
COMMUNITY
Start: 2018-05-14

## 2018-09-26 RX ORDER — MONTELUKAST SODIUM 10 MG/1
10 TABLET ORAL DAILY
COMMUNITY
Start: 2018-05-14

## 2018-09-26 RX ORDER — HYDROCHLOROTHIAZIDE 12.5 MG/1
12.5 TABLET ORAL DAILY
COMMUNITY
Start: 2018-07-02 | End: 2019-10-02 | Stop reason: ALTCHOICE

## 2018-09-26 NOTE — PROGRESS NOTES
Date of Office Visit: 2018  Encounter Provider: Domitila Villagomez, SHANNA, APRN  Place of Service: [facility] CARDIOLOGY  Patient Name: Anabelle Santiago  :1945      Subjective:     Chief Complaint:  Follow-up coronary artery disease, shortness of breath.    History of Present Illness:  Anabelle Santiago is a 72 y.o. female patient of Dr. Rao.  This is my first time seeing this patient in the office and I have reviewed her records.    Patient has a history of coronary artery disease, shortness of breath, diabetes, hypertension.    Patient was initially seen by Dr. Rao 10/16/15 after having increasing shortness of breath and chest discomfort over the previous 6 weeks.  Given the severity of her symptoms she underwent a left heart catheterization 10/22/15 by Dr. Stafford.  This showed borderline coronary artery disease, including a 50-60% ostial LAD stenosis, and a 50% distal left circumflex lesion. FFR of the distal left circumflex was normal, and medical therapy was recommended.  Patient underwent echocardiogram 10/26/15 that showed ejection fraction of 56%, grade 1 diastolic dysfunction, and no significant valvular disease.  Patient underwent a CT angiogram of her chest 11/2/15 which was essentially normal.  Ultimately it was felt that intermittent hypotension may have been the cause of some of her symptoms, and her enalapril dose was decreased and her amlodipine was discontinued.  Patient subsequently felt better.  Patient had been having BP readings at home as low as 84/48 at times for that.    Patient was last seen in office by Dr. Rao 18.  At this point it was noted that she had recently been diagnosed with severe obstructive sleep apnea after having several episodes of gasping for breath at night.  She had been using a CPAP machine.  She reported increased shortness of breath with exertion, especially with moderate exertion or heavier exertion.  She  had recently been placed on Advair and was also told to use albuterol inhaler twice a day, per past office visit note.  She denied chest pain.    Patient presents to office today for follow-up appointment.  Blood pressure in office today is 128/76, and heart rate is 68 bpm.  Patient denies any chest pain, shortness of breath, palpitations, racing heartbeat sensation, lower extremity edema, dizziness, lightheadedness.  She has a history of sleep apnea and continues to use CPAP machine regularly.  She reports that she was told that she has not needed to see sleep medicine again for 1 year.  She has a history of some shortness of breath with exertion if she forgets to use her inhalers due to asthma.  The patient reports that as long as she uses her inhalers regularly she does not have any shortness of breath with exertion.  He reports blood pressure outside office has been staying 130s over 70s.  She recently had cholesterol checked at her primary care provider's office and it was not at goal.  She reports that she has a follow-up appointment scheduled to get it rechecked in October.  She reports that she is now following a low cholesterol diet.  She reports that she cannot take Lipitor/atorvastatin due to history of vasculitis with this medication.  She reports that she was seen by [facility] for this issue and they told her not to take this medication anymore.  If cholesterol remains elevated will discuss with Dr. Rao possibly trying rosuvastatin if he feels comfortable with this.  Patient does not think she has ever tried rosuvastatin.    Patient will follow-up with Dr. Rao in 6 months or sooner if needed for any new or worsening symptoms or other problems/concerns.      Past Medical History:   Diagnosis Date   • Asthma    • CAD (coronary artery disease)     Cath on 10/22/15 by Dr. Stafford: Left main normal.  LAD with 50-60% ostial disease and 30% distal.  LCx large with 30% ostial and 50% distal  disease.  OM1 30% proximal.  RCA dominant and normal.  FFR of distal left circumflex was normal.   • Diabetes mellitus (CMS/HCC)    • Hyperlipidemia    • Hypertension    • YAMILETH (obstructive sleep apnea)     on CPAP   • Osteoarthritis    • Recurrent UTI    • Shortness of breath    • Vasculitis (CMS/HCC)     Diagnosed at [facility] - history of vascultits of small vessels of hands and feet     Past Surgical History:   Procedure Laterality Date   • APPENDECTOMY     • BLADDER SURGERY      repair   • HYSTERECTOMY     • TONSILLECTOMY     • TOTAL HIP ARTHROPLASTY Bilateral      Outpatient Medications Prior to Visit   Medication Sig Dispense Refill   • albuterol (PROVENTIL HFA;VENTOLIN HFA) 108 (90 BASE) MCG/ACT inhaler Albuterol 90 MCG/ACT AERS; Patient Sig: Albuterol 90 MCG/ACT AERS ; 0; 15-Oct-2015; Active     • albuterol (PROVENTIL HFA;VENTOLIN HFA) 108 (90 BASE) MCG/ACT inhaler ProAir  (90 Base) MCG/ACT Inhalation Aerosol Solution; Patient Sig: ProAir  (90 Base) MCG/ACT Inhalation Aerosol Solution ; 8; 0; 16-Sep-2015; Active     • aspirin 81 MG EC tablet Take 81 mg by mouth daily.     • calcium carbonate (OS-LISSY) 600 MG tablet Take 600 mg by mouth daily.     • cetirizine (ZyrTEC) 10 MG tablet Take 10 mg by mouth daily.     • dicyclomine (BENTYL) 10 MG capsule TAKE 1 CAPSULE BY MOUTH 4 (FOUR) TIMES DAILY BEFORE MEALS AND NIGHTLY  5   • enalapril (VASOTEC) 20 MG tablet 10 mg. TAKE 1 TABLET BY MOUTH DAILY  1   • fluticasone (FLONASE) 50 MCG/ACT nasal spray into each nostril.     • gabapentin (NEURONTIN) 800 MG tablet 800 mg 4 (Four) Times a Day.     • meloxicam (MOBIC) 15 MG tablet TAKE 1 TABLET BY MOUTH DAILY AS NEEDED FOR PAIN  1   • oxyCODONE-acetaminophen (PERCOCET)  MG per tablet TAKE 1 TABLET BY MOUTH EVERY 4 HOURS  0   • Polyethylene Glycol 3350 (MIRALAX PO) Take  by mouth.     • simvastatin (ZOCOR) 80 MG tablet TAKE 1 TABLET BY MOUTH NIGHTLY  1   • VITAMIN D, CHOLECALCIFEROL, PO Take  by mouth.      • alendronate (FOSAMAX) 70 MG tablet TAKE 1 TABLET BY MOUTH EVERY 7 DAYS WITH 80Z OF WATER, ON EMPTY STOMACH  2   • escitalopram (LEXAPRO) 10 MG tablet Take 10 mg by mouth.       No facility-administered medications prior to visit.        Allergies as of 09/26/2018 - Reviewed 09/26/2018   Allergen Reaction Noted   • Lipitor [atorvastatin] Unknown (See Comments) 09/26/2018     Social History     Social History   • Marital status:      Spouse name: N/A   • Number of children: N/A   • Years of education: N/A     Occupational History   • Not on file.     Social History Main Topics   • Smoking status: Passive Smoke Exposure - Never Smoker   • Smokeless tobacco: Never Used      Comment: 23 years of secondhand smoke exposure   • Alcohol use No   • Drug use: No   • Sexual activity: Not on file     Other Topics Concern   • Not on file     Social History Narrative   • No narrative on file     Family History   Problem Relation Age of Onset   • Stroke Mother         CVA   • Leukemia Father    • Coronary artery disease Brother         MI at 41   • Cancer Maternal Grandmother         colon cancer       Review of Systems   Constitution: Negative for chills, fever, malaise/fatigue, night sweats, weight gain and weight loss.   HENT: Negative for ear pain, hearing loss, nosebleeds and sore throat.    Eyes: Negative for blurred vision, double vision, redness, vision loss in left eye, vision loss in right eye and visual disturbance.   Cardiovascular: Positive for dyspnea on exertion.        SEE HPI.    Respiratory: Positive for wheezing. Negative for cough, hemoptysis, shortness of breath and snoring.    Endocrine: Negative for cold intolerance and heat intolerance.   Skin: Negative for itching, rash and suspicious lesions.   Musculoskeletal: Positive for joint pain and joint swelling. Negative for myalgias.   Gastrointestinal: Negative for abdominal pain, diarrhea, hematemesis, melena, nausea and vomiting.   Genitourinary:  "Negative for dysuria, frequency and hematuria.   Neurological: Negative for dizziness, headaches, numbness, paresthesias and seizures.   Psychiatric/Behavioral: Negative for altered mental status and depression. The patient is not nervous/anxious.           Objective:     Vitals:    09/26/18 1315   BP: 128/76   BP Location: Left arm   Pulse: 68   Weight: 64 kg (141 lb)   Height: 149.9 cm (59\")     Body mass index is 28.48 kg/m².    PHYSICAL EXAM:  Physical Exam   Constitutional: She is oriented to person, place, and time. She appears well-developed and well-nourished. No distress.   HENT:   Head: Normocephalic and atraumatic.   Eyes: Pupils are equal, round, and reactive to light. No scleral icterus.   Neck: Neck supple. No JVD present. Carotid bruit is not present. No tracheal deviation present.   Cardiovascular: Normal rate, regular rhythm, normal heart sounds and intact distal pulses.  Exam reveals no gallop and no friction rub.    No murmur heard.  Pulses:       Radial pulses are 2+ on the right side, and 2+ on the left side.        Posterior tibial pulses are 2+ on the right side, and 2+ on the left side.   Pulmonary/Chest: Effort normal and breath sounds normal. No respiratory distress. She has no wheezes. She has no rales.   Abdominal: Soft. Bowel sounds are normal. She exhibits no distension. There is no tenderness. There is no rebound and no guarding.   Musculoskeletal: She exhibits no edema, tenderness or deformity.   Neurological: She is alert and oriented to person, place, and time.   Skin: Skin is warm and dry. No rash noted. She is not diaphoretic. No erythema.   Psychiatric: She has a normal mood and affect. Her behavior is normal. Judgment normal.         ECG 12 Lead  Date/Time: 9/26/2018 1:36 PM  Performed by: ALLISON MANRIQUEZ  Authorized by: ALLISON MANRIQUEZ   Comparison: compared with previous ECG from 10/16/2015  Rhythm: sinus rhythm  Rate: normal  BPM: 68              Assessment:       " Diagnosis Plan   1. Coronary artery disease involving native coronary artery of native heart without angina pectoris     2. Essential hypertension     3. Mixed hyperlipidemia     4. Prediabetes     5. Persistent asthma, unspecified asthma severity, unspecified whether complicated         Plan:     1. Coronary artery disease: Patient remains on aspirin 81 mg daily.  She denies any recent chest pain.  As long as she uses her inhalers for asthma she has not had any shortness of breath with exertion.  Patient feeling well since last office visit.  She denies any new or worsening symptoms.  She is clinically stable.  2. Hypertension: Patient on enalapril 10 mg daily.  Patient on HCTZ 12.5 mg daily.  Blood pressure staying 130s over 70s outside office.  3. Hyperlipidemia: Patient remains on simvastatin 80 mg nightly.  Recent cholesterol was still elevated.  Total cholesterol was 243 and LDL was 166.  Triglycerides were 89 and HDL was 59.  Patient is scheduled to get cholesterol rechecked by primary care provider in October.  She reports she'll have a copy of the report sent to our office.  4. Prediabetes: A1c 6.4% on 7/2018 labs.  Followed by patient's primary care provider.  5. Asthma: Patient remains on Symbicort and albuterol.  Managed by outside provider.      Follow-up with Dr. Rao in 6 months or return to clinic sooner if needed for any new or worsening symptoms or other problems/concerns.           Your medication list          Accurate as of 9/26/18  2:09 PM. If you have any questions, ask your nurse or doctor.               CONTINUE taking these medications      Instructions Last Dose Given Next Dose Due   albuterol 108 (90 Base) MCG/ACT inhaler  Commonly known as:  PROVENTIL HFA;VENTOLIN HFA      ProAir  (90 Base) MCG/ACT Inhalation Aerosol Solution; Patient Sig: ProAir  (90 Base) MCG/ACT Inhalation Aerosol Solution ; 8; 0; 16-Sep-2015; Active       albuterol 108 (90 Base) MCG/ACT  inhaler  Commonly known as:  PROVENTIL HFA;VENTOLIN HFA      Albuterol 90 MCG/ACT AERS; Patient Sig: Albuterol 90 MCG/ACT AERS ; 0; 15-Oct-2015; Active       aspirin 81 MG EC tablet      Take 81 mg by mouth daily.       calcium carbonate 600 MG tablet  Commonly known as:  OS-LISSY      Take 600 mg by mouth daily.       cetirizine 10 MG tablet  Commonly known as:  zyrTEC      Take 10 mg by mouth daily.       dicyclomine 10 MG capsule  Commonly known as:  BENTYL      TAKE 1 CAPSULE BY MOUTH 4 (FOUR) TIMES DAILY BEFORE MEALS AND NIGHTLY       enalapril 20 MG tablet  Commonly known as:  VASOTEC      10 mg. TAKE 1 TABLET BY MOUTH DAILY       FLONASE 50 MCG/ACT nasal spray  Generic drug:  fluticasone      into each nostril.       gabapentin 800 MG tablet  Commonly known as:  NEURONTIN      800 mg 4 (Four) Times a Day.       hydrochlorothiazide 12.5 MG tablet  Commonly known as:  HYDRODIURIL      Take 12.5 mg by mouth Daily.       meloxicam 15 MG tablet  Commonly known as:  MOBIC      TAKE 1 TABLET BY MOUTH DAILY AS NEEDED FOR PAIN       MIRALAX PO      Take  by mouth.       montelukast 10 MG tablet  Commonly known as:  SINGULAIR      Take 10 mg by mouth Daily.       omeprazole 40 MG capsule  Commonly known as:  priLOSEC      Take 40 mg by mouth Daily.       oxyCODONE-acetaminophen  MG per tablet  Commonly known as:  PERCOCET      TAKE 1 TABLET BY MOUTH EVERY 4 HOURS       simvastatin 80 MG tablet  Commonly known as:  ZOCOR      TAKE 1 TABLET BY MOUTH NIGHTLY       SYMBICORT 160-4.5 MCG/ACT inhaler  Generic drug:  budesonide-formoterol      Inhale 2 puffs Daily.       VITAMIN D (CHOLECALCIFEROL) PO      Take  by mouth.          STOP taking these medications    alendronate 70 MG tablet  Commonly known as:  FOSAMAX  Stopped by:  Domitila Villagomez DNP, APRN        escitalopram 10 MG tablet  Commonly known as:  LEXAPRO  Stopped by:  Domitila Villagomez DNP, APRN           I did not stop or change the above medications.   Patient's medication list was updated to reflect medications she is currently taking including medication changes made by other providers.         Domitila Villagomez DNP, APRN  09/26/2018       Dictated utilizing Dragon dictation

## 2019-03-25 ENCOUNTER — OFFICE VISIT (OUTPATIENT)
Dept: CARDIOLOGY | Facility: CLINIC | Age: 74
End: 2019-03-25

## 2019-03-25 VITALS
SYSTOLIC BLOOD PRESSURE: 182 MMHG | DIASTOLIC BLOOD PRESSURE: 88 MMHG | HEIGHT: 59 IN | OXYGEN SATURATION: 98 % | WEIGHT: 146 LBS | HEART RATE: 68 BPM | BODY MASS INDEX: 29.43 KG/M2

## 2019-03-25 DIAGNOSIS — E78.2 MIXED HYPERLIPIDEMIA: ICD-10-CM

## 2019-03-25 DIAGNOSIS — G47.33 OSA (OBSTRUCTIVE SLEEP APNEA): ICD-10-CM

## 2019-03-25 DIAGNOSIS — I25.10 CORONARY ARTERY DISEASE INVOLVING NATIVE CORONARY ARTERY OF NATIVE HEART WITHOUT ANGINA PECTORIS: Primary | ICD-10-CM

## 2019-03-25 DIAGNOSIS — I10 ESSENTIAL HYPERTENSION: ICD-10-CM

## 2019-03-25 PROCEDURE — 99214 OFFICE O/P EST MOD 30 MIN: CPT | Performed by: NURSE PRACTITIONER

## 2019-03-25 RX ORDER — METOPROLOL SUCCINATE 25 MG/1
25 TABLET, EXTENDED RELEASE ORAL DAILY
COMMUNITY
Start: 2018-12-24 | End: 2019-10-02

## 2019-03-25 NOTE — PROGRESS NOTES
[facility]      Patient Name: Anabelle Santiago  :1945  Age: 73 y.o.  Primary Cardiologist: Monico Rao MD  Encounter Provider:  BALAJI Yee      Chief Complaint:   Chief Complaint   Patient presents with   • Follow-up     6 months   • Coronary Artery Disease   • Hypertension         HPI  Anabelle Santiago is a 73 y.o. female with a history significant for coronary artery disease, diabetes, hypertension, hyperlipidemia, obstructive sleep apnea.  Patient presents today for routine evaluation.  Patient is new to me but I reviewed prior medical records.  Patient reports that she is been doing fairly well over the past 6 months.  She states that she has been battling a sinus infection and reports that she has been taking over-the-counter decongestants such as NyQuil, Sudafed, Mucinex cold and sinus.  She states that her biggest complaint is a cough and sinus pressure.  She denies any cardiovascular complaints such as chest pain, shortness of breath, palpitations, lightheadedness, swelling, fatigue.  She states that she is not taking her blood pressure routinely.    The following portions of the patient's history were reviewed and updated as appropriate: allergies, current medications, past family history, past medical history, past social history, past surgical history and problem list.    Current Outpatient Medications on File Prior to Visit   Medication Sig   • albuterol (PROVENTIL HFA;VENTOLIN HFA) 108 (90 BASE) MCG/ACT inhaler Albuterol 90 MCG/ACT AERS; Patient Sig: Albuterol 90 MCG/ACT AERS ; 0; 15-Oct-2015; Active   • aspirin 81 MG EC tablet Take 81 mg by mouth daily.   • budesonide-formoterol (SYMBICORT) 160-4.5 MCG/ACT inhaler Inhale 2 puffs Daily.   • calcium carbonate (OS-LISSY) 600 MG tablet Take 600 mg by mouth daily.   • cetirizine (ZyrTEC) 10 MG tablet Take 10 mg by mouth daily.   • dicyclomine (BENTYL) 10 MG capsule TAKE 1 CAPSULE BY MOUTH 4 (FOUR) TIMES DAILY  "BEFORE MEALS AND NIGHTLY   • enalapril (VASOTEC) 20 MG tablet 10 mg. TAKE 1 TABLET BY MOUTH DAILY   • fluticasone (FLONASE) 50 MCG/ACT nasal spray into each nostril.   • gabapentin (NEURONTIN) 800 MG tablet 800 mg 4 (Four) Times a Day.   • hydrochlorothiazide (HYDRODIURIL) 12.5 MG tablet Take 12.5 mg by mouth Daily.   • meloxicam (MOBIC) 15 MG tablet TAKE 1 TABLET BY MOUTH DAILY AS NEEDED FOR PAIN   • metoprolol succinate XL (TOPROL XL) 25 MG 24 hr tablet Take 25 mg by mouth Daily.   • montelukast (SINGULAIR) 10 MG tablet Take 10 mg by mouth Daily.   • oxyCODONE-acetaminophen (PERCOCET)  MG per tablet TAKE 1 TABLET BY MOUTH EVERY 4 HOURS   • Polyethylene Glycol 3350 (MIRALAX PO) Take  by mouth.   • simvastatin (ZOCOR) 80 MG tablet TAKE 1 TABLET BY MOUTH NIGHTLY   • VITAMIN D, CHOLECALCIFEROL, PO Take  by mouth.   • [DISCONTINUED] albuterol (PROVENTIL HFA;VENTOLIN HFA) 108 (90 BASE) MCG/ACT inhaler ProAir  (90 Base) MCG/ACT Inhalation Aerosol Solution; Patient Sig: ProAir  (90 Base) MCG/ACT Inhalation Aerosol Solution ; 8; 0; 16-Sep-2015; Active   • [DISCONTINUED] omeprazole (priLOSEC) 40 MG capsule Take 40 mg by mouth Daily.     No current facility-administered medications on file prior to visit.          Review of Systems   Constitution: Positive for malaise/fatigue.   HENT: Positive for sore throat.    Cardiovascular: Negative for chest pain and leg swelling.   Respiratory: Positive for cough, snoring and wheezing. Negative for shortness of breath.    Endocrine: Positive for cold intolerance.   Musculoskeletal: Positive for joint pain.   Neurological: Negative for light-headedness.   All other systems reviewed and are negative.      OBJECTIVE:   Vital Signs  Vitals:    03/25/19 1306   BP: (!) 182/88   Pulse: 68   SpO2: 98%     Estimated body mass index is 29.49 kg/m² as calculated from the following:    Height as of this encounter: 149.9 cm (59\").    Weight as of this encounter: 66.2 kg (146 " lb).    Physical Exam   Constitutional: She is oriented to person, place, and time. Vital signs are normal. She appears well-developed and well-nourished. She is active.   Eyes: Conjunctivae are normal.   Neck: Carotid bruit is not present.   Cardiovascular: Normal rate, regular rhythm and normal heart sounds.   Pulmonary/Chest: Breath sounds normal.   Abdominal: Normal appearance.   Musculoskeletal:   No cyanosis, clubbing, edema  Normal ROM   Neurological: She is alert and oriented to person, place, and time. GCS eye subscore is 4. GCS verbal subscore is 5. GCS motor subscore is 6.   Skin: Skin is warm, dry and intact.   Psychiatric: She has a normal mood and affect. Her speech is normal and behavior is normal. Judgment and thought content normal. Cognition and memory are normal.       Procedures    Cardiac Procedures:  1. Left heart catheterization 10/22/15.  Normal left main, left circumflex was a large vessel with 30% ostial stenosis and 50% distal stenosis.  First obtuse marginal branch had a 30% proximal stenosis.  LAD was large and had an ostial 50-60% stenosis followed by a 30% distal stenosis.  RCA was dominant and normal.  FFR of the distal circumflex was normal.  2. Echocardiogram 10/26/15.  EF 56%.  Grade 1 left ventricular diastolic dysfunction.  Right ventricle was normal in size and function.  No significant valvular disease.  3. CTA chest 11/2/15.  No evidence of pulmonary embolism or aortic pathology.  4. Echocardiogram 3/9/18.  EF 56%.  Grade 1 left ventricular diastolic dysfunction.  5. PET stress test 3/9/18.  EF 65%.  Normal myocardial perfusion study without evidence of ischemia.  Impressions consistent with low risk study.        ASSESSMENT:      Diagnosis Plan   1. Coronary artery disease involving native coronary artery of native heart without angina pectoris     2. Essential hypertension     3. Mixed hyperlipidemia     4. YAMILETH (obstructive sleep apnea)           PLAN OF CARE:      1. Coronary artery disease.  Patient denies any chest pain, shortness of breath, fatigue.  She states that overall she is feeling well other than a bit of a sinus infection/cold.  She will continue taking that metoprolol, simvastatin, aspirin for secondary prevention.  2. Hypertension.  Patient's blood pressure is elevated in office today at 182/88.  Unfortunately patient has not measured blood pressure routinely at home.  She also notes that she has been taking over-the-counter decongestants such as Sudafed, NyQuil, Mucinex cold and sinus.  She states that she is unaware that these would affect her blood pressure.  I have advised the patient to stay away from decongestants in general.  I informed her that Coricidin HBP and Mucinex (that just has guaifenesin) are what is recommended in a patient with high blood pressure.  I have recommended that she return in 2 weeks for blood pressure check.  She states that she is going to be seeing her primary care physician in the next week and will get her blood pressure checked there.  I advised her that if it is still elevated and her primary care physician's office that we need to reevaluate that.  At this point I do not think we should make any changes to her current regimen as I believe that decongestants could be contributing.  We will continue to monitor.  3. Hyperlipidemia.  Patient is currently taking simvastatin.  Lipid panel from 10/8/18 was reviewed which reveals a total cholesterol 201, HDL 68, , triglycerides 75.  Will continue with current regimen.  4. Obstructive sleep apnea.  Compliant with CPAP machine, although she states she is having difficulty when she is coughing at night.  5. Follow-up in 2 weeks for blood pressure check if still elevated at primary care physician's office.  Otherwise follow-up with Dr. Rao in 6 months.      Coronary Artery Disease  Assessment  • The patient has no angina    Plan  • Lifestyle modifications discussed  include adhering to a heart healthy diet, avoidance of tobacco products, maintenance of a healthy weight, medication compliance, regular exercise and regular monitoring of cholesterol and blood pressure    Subjective - Objective  • Current antiplatelet therapy includes aspirin 81 mg          Thank you for allowing me to participate in the care of your patient,      Sincerely,   BALAJI Yee  [facility]  03/25/19  1:32 PM    **Celso Disclaimer:**  Much of this encounter note is an electronic transcription/translation of spoken language to printed text. The electronic translation of spoken language may permit erroneous, or at times, nonsensical words or phrases to be inadvertently transcribed. Although I have reviewed the note for such errors, some may still exist.

## 2019-05-24 ENCOUNTER — TRANSCRIBE ORDERS (OUTPATIENT)
Dept: ADMINISTRATIVE | Facility: HOSPITAL | Age: 74
End: 2019-05-24

## 2019-05-24 DIAGNOSIS — M46.1 SACROILIITIS, NOT ELSEWHERE CLASSIFIED (HCC): Primary | ICD-10-CM

## 2019-05-29 ENCOUNTER — HOSPITAL ENCOUNTER (OUTPATIENT)
Dept: CT IMAGING | Facility: HOSPITAL | Age: 74
Discharge: HOME | End: 2019-05-29
Admitting: ORTHOPAEDIC SURGERY

## 2019-05-29 ENCOUNTER — APPOINTMENT (OUTPATIENT)
Dept: PAIN MEDICINE | Facility: HOSPITAL | Age: 74
End: 2019-05-29

## 2019-05-29 ENCOUNTER — TRANSCRIBE ORDERS (OUTPATIENT)
Dept: ADMINISTRATIVE | Facility: HOSPITAL | Age: 74
End: 2019-05-29

## 2019-05-29 ENCOUNTER — HOSPITAL ENCOUNTER (OUTPATIENT)
Dept: GENERAL RADIOLOGY | Facility: HOSPITAL | Age: 74
End: 2019-05-29

## 2019-05-29 DIAGNOSIS — M46.1 BILATERAL SACROILIITIS (HCC): ICD-10-CM

## 2019-05-29 DIAGNOSIS — M46.1 BILATERAL SACROILIITIS (HCC): Primary | ICD-10-CM

## 2019-05-29 PROCEDURE — 25010000002 METHYLPREDNISOLONE PER 80 MG: Performed by: RADIOLOGY

## 2019-05-29 RX ORDER — BUPIVACAINE HYDROCHLORIDE 2.5 MG/ML
10 INJECTION, SOLUTION EPIDURAL; INFILTRATION; INTRACAUDAL ONCE
Status: COMPLETED | OUTPATIENT
Start: 2019-05-29 | End: 2019-05-29

## 2019-05-29 RX ORDER — METHYLPREDNISOLONE ACETATE 80 MG/ML
80 INJECTION, SUSPENSION INTRA-ARTICULAR; INTRALESIONAL; INTRAMUSCULAR; SOFT TISSUE ONCE
Status: COMPLETED | OUTPATIENT
Start: 2019-05-29 | End: 2019-05-29

## 2019-05-29 RX ORDER — LIDOCAINE HYDROCHLORIDE 10 MG/ML
10 INJECTION, SOLUTION INFILTRATION; PERINEURAL ONCE
Status: COMPLETED | OUTPATIENT
Start: 2019-05-29 | End: 2019-05-29

## 2019-05-29 RX ORDER — LIDOCAINE HYDROCHLORIDE 10 MG/ML
20 INJECTION, SOLUTION INFILTRATION; PERINEURAL ONCE
Status: COMPLETED | OUTPATIENT
Start: 2019-05-29 | End: 2019-05-29

## 2019-05-29 RX ADMIN — BUPIVACAINE HYDROCHLORIDE 6 ML: 2.5 INJECTION, SOLUTION EPIDURAL; INFILTRATION; INTRACAUDAL; PERINEURAL at 11:41

## 2019-05-29 RX ADMIN — LIDOCAINE HYDROCHLORIDE 20 ML: 10 INJECTION, SOLUTION INFILTRATION; PERINEURAL at 11:31

## 2019-05-29 RX ADMIN — METHYLPREDNISOLONE ACETATE 80 MG: 80 INJECTION, SUSPENSION INTRA-ARTICULAR; INTRALESIONAL; INTRAMUSCULAR; SOFT TISSUE at 11:42

## 2019-05-29 RX ADMIN — LIDOCAINE HYDROCHLORIDE 10 ML: 10 INJECTION, SOLUTION INFILTRATION; PERINEURAL at 11:44

## 2019-10-02 ENCOUNTER — OFFICE VISIT (OUTPATIENT)
Dept: CARDIOLOGY | Facility: CLINIC | Age: 74
End: 2019-10-02

## 2019-10-02 VITALS
HEART RATE: 66 BPM | WEIGHT: 141 LBS | HEIGHT: 59 IN | SYSTOLIC BLOOD PRESSURE: 174 MMHG | DIASTOLIC BLOOD PRESSURE: 86 MMHG | BODY MASS INDEX: 28.43 KG/M2

## 2019-10-02 DIAGNOSIS — M54.2 ANTERIOR NECK PAIN: ICD-10-CM

## 2019-10-02 DIAGNOSIS — I25.10 CORONARY ARTERY DISEASE INVOLVING NATIVE CORONARY ARTERY OF NATIVE HEART WITHOUT ANGINA PECTORIS: Primary | ICD-10-CM

## 2019-10-02 DIAGNOSIS — I79.8 OTHER DISORDERS OF ARTERIES, ARTERIOLES AND CAPILLARIES IN DISEASES CLASSIFIED ELSEWHERE (HCC): ICD-10-CM

## 2019-10-02 DIAGNOSIS — I10 ESSENTIAL HYPERTENSION: ICD-10-CM

## 2019-10-02 DIAGNOSIS — E78.2 MIXED HYPERLIPIDEMIA: ICD-10-CM

## 2019-10-02 DIAGNOSIS — G47.33 OSA (OBSTRUCTIVE SLEEP APNEA): ICD-10-CM

## 2019-10-02 PROBLEM — E78.5 DYSLIPIDEMIA: Status: ACTIVE | Noted: 2018-10-08

## 2019-10-02 PROBLEM — E78.5 HYPERLIPIDEMIA: Status: ACTIVE | Noted: 2018-09-26

## 2019-10-02 PROBLEM — F03.90 DEMENTIA (HCC): Status: ACTIVE | Noted: 2019-10-02

## 2019-10-02 PROCEDURE — 99214 OFFICE O/P EST MOD 30 MIN: CPT | Performed by: NURSE PRACTITIONER

## 2019-10-02 PROCEDURE — 93000 ELECTROCARDIOGRAM COMPLETE: CPT | Performed by: NURSE PRACTITIONER

## 2019-10-02 RX ORDER — CARVEDILOL 12.5 MG/1
TABLET ORAL
COMMUNITY
Start: 2019-07-15 | End: 2019-10-02

## 2019-10-02 RX ORDER — CARVEDILOL 6.25 MG/1
6.25 TABLET ORAL 2 TIMES DAILY WITH MEALS
COMMUNITY

## 2019-10-02 RX ORDER — OXYBUTYNIN CHLORIDE 5 MG/1
5 TABLET ORAL DAILY
COMMUNITY

## 2019-10-02 RX ORDER — OMEPRAZOLE 40 MG/1
40 CAPSULE, DELAYED RELEASE ORAL DAILY
COMMUNITY

## 2019-10-02 RX ORDER — ENALAPRIL MALEATE 20 MG/1
20 TABLET ORAL DAILY
COMMUNITY
Start: 2019-08-12 | End: 2019-10-02

## 2019-10-02 NOTE — PROGRESS NOTES
[facility]      Patient Name: Anabelle Santiago  :1945  Age: 73 y.o.  Primary Cardiologist: Monico Rao MD  Encounter Provider:  BALAJI Yee      Chief Complaint:   Chief Complaint   Patient presents with   • Coronary Artery Disease   • Hypertension         Coronary Artery Disease   Pertinent negatives include no chest pain, leg swelling or shortness of breath.   Hypertension   Associated symptoms include malaise/fatigue and neck pain. Pertinent negatives include no chest pain or shortness of breath.     Anabelle Santiago is a 73 y.o. female who presents today for semiannual follow-up.      Patient with a history significant for coronary artery disease, diabetes, hypertension, hyperlipidemia, obstructive sleep apnea.      Patient reports that she is doing fairly well over the past 6 weeks.  She notes that her blood pressure is very labile and will be stable at 120s-130s at times but then will elevate to the 170s-180s at other times.  Her current antihypertensive regimen includes carvedilol 6.25 mg twice daily and enalapril 20 mg daily (it appears that in the past patient was on metoprolol succinate and this was changed to carvedilol by primary care for unknown reason).  Patient is complaining of some right anterior neck pain for 1 week.  She denies any injury.  Patient denies chest pain, shortness of breath, palpitations, lightheadedness, swelling.  She does report some baseline fatigue and occasional episodes of dyspnea with exertion.    The following portions of the patient's history were reviewed and updated as appropriate: allergies, current medications, past family history, past medical history, past social history, past surgical history and problem list.    Current Outpatient Medications on File Prior to Visit   Medication Sig   • albuterol (PROVENTIL HFA;VENTOLIN HFA) 108 (90 BASE) MCG/ACT inhaler Albuterol 90 MCG/ACT AERS; Patient Sig: Albuterol 90 MCG/ACT AERS ;  "0; 15-Oct-2015; Active   • aspirin 81 MG EC tablet Take 81 mg by mouth daily.   • budesonide-formoterol (SYMBICORT) 160-4.5 MCG/ACT inhaler Inhale 2 puffs Daily.   • calcium carbonate (OS-LISSY) 600 MG tablet Take 600 mg by mouth daily.   • cetirizine (ZyrTEC) 10 MG tablet Take 10 mg by mouth daily.   • dicyclomine (BENTYL) 10 MG capsule TAKE 1 CAPSULE BY MOUTH 4 (FOUR) TIMES DAILY BEFORE MEALS AND NIGHTLY   • enalapril (VASOTEC) 20 MG tablet 10 mg. TAKE 1 TABLET BY MOUTH DAILY   • fluticasone (FLONASE) 50 MCG/ACT nasal spray into each nostril.   • gabapentin (NEURONTIN) 800 MG tablet 800 mg 4 (Four) Times a Day.   • hydrochlorothiazide (HYDRODIURIL) 12.5 MG tablet Take 12.5 mg by mouth Daily.   • meloxicam (MOBIC) 15 MG tablet TAKE 1 TABLET BY MOUTH DAILY AS NEEDED FOR PAIN   • metoprolol succinate XL (TOPROL XL) 25 MG 24 hr tablet Take 25 mg by mouth Daily.   • montelukast (SINGULAIR) 10 MG tablet Take 10 mg by mouth Daily.   • oxyCODONE-acetaminophen (PERCOCET)  MG per tablet TAKE 1 TABLET BY MOUTH EVERY 4 HOURS   • Polyethylene Glycol 3350 (MIRALAX PO) Take  by mouth.   • simvastatin (ZOCOR) 80 MG tablet TAKE 1 TABLET BY MOUTH NIGHTLY   • VITAMIN D, CHOLECALCIFEROL, PO Take  by mouth.     No current facility-administered medications on file prior to visit.          Review of Systems   Constitution: Positive for malaise/fatigue.   HENT: Positive for sore throat.    Cardiovascular: Negative for chest pain and leg swelling.   Respiratory: Positive for cough, snoring and wheezing. Negative for shortness of breath.    Endocrine: Positive for cold intolerance.   Musculoskeletal: Positive for joint pain and neck pain.   Neurological: Negative for light-headedness.   All other systems reviewed and are negative.      OBJECTIVE:   Vital Signs  Vitals:    10/02/19 1434   BP: 174/86   Pulse:      Estimated body mass index is 28.48 kg/m² as calculated from the following:    Height as of this encounter: 149.9 cm (59\").    " Weight as of this encounter: 64 kg (141 lb).    Physical Exam   Constitutional: She is oriented to person, place, and time. Vital signs are normal. She appears well-developed and well-nourished. She is active.   Eyes: Conjunctivae are normal.   Neck: Carotid bruit is not present.   Cardiovascular: Normal rate, regular rhythm and normal heart sounds.   Pulmonary/Chest: Breath sounds normal.   Abdominal: Normal appearance.   Musculoskeletal:   No cyanosis, clubbing, edema  Normal ROM   Neurological: She is alert and oriented to person, place, and time. GCS eye subscore is 4. GCS verbal subscore is 5. GCS motor subscore is 6.   Skin: Skin is warm, dry and intact.   Psychiatric: She has a normal mood and affect. Her speech is normal and behavior is normal. Judgment and thought content normal. Cognition and memory are normal.         ECG 12 Lead  Date/Time: 10/2/2019 2:23 PM  Performed by: Janell Nicholson APRN  Authorized by: Janell Nicholson APRN   Comparison: compared with previous ECG from 9/26/2018  Rhythm: sinus rhythm  Ectopy: atrial premature contractions  Rate: normal  Conduction: conduction normal  ST Segments: ST segments normal  T Waves: T waves normal  QRS axis: normal  Other: no other findings    Clinical impression: normal ECG            Cardiac Procedures:  1. Left heart catheterization 10/22/15.  Normal left main, left circumflex was a large vessel with 30% ostial stenosis and 50% distal stenosis.  First obtuse marginal branch had a 30% proximal stenosis.  LAD was large and had an ostial 50-60% stenosis followed by a 30% distal stenosis.  RCA was dominant and normal.  FFR of the distal circumflex was normal.  2. Echocardiogram 10/26/15.  EF 56%.  Grade 1 left ventricular diastolic dysfunction.  Right ventricle was normal in size and function.  No significant valvular disease.  3. CTA chest 11/2/15.  No evidence of pulmonary embolism or aortic pathology.  4. Echocardiogram 3/9/18.  EF 56%.  Grade 1  left ventricular diastolic dysfunction.  5. PET stress test 3/9/18.  EF 65%.  Normal myocardial perfusion study without evidence of ischemia.  Impressions consistent with low risk study.        ASSESSMENT:      Diagnosis Plan   1. Coronary artery disease involving native coronary artery of native heart without angina pectoris  ECG 12 Lead   2. Essential hypertension  Duplex Renal Artery - Bilateral Complete CAR   3. Mixed hyperlipidemia     4. YAMILETH (obstructive sleep apnea)     5. Anterior neck pain  Duplex Carotid Ultrasound CAR   6. Other disorders of arteries, arterioles and capillaries in diseases classified elsewhere (CMS/HCC)   Duplex Carotid Ultrasound CAR         PLAN OF CARE:     1. Coronary artery disease.  Currently denies angina or dyspnea.  She does report some right-sided anterior neck pain that is not associated with any previous pain similar to her coronary disease.  ECG today is unchanged.  She will continue with GDMT with aspirin, carvedilol, enalapril, simvastatin.  2. Hypertension.  Blood pressure has been labile.  It is elevated today at 174/84.  She reports at home it gets as low as the 120s-130s at times and then will elevate to the 170s-180s at times.  I am concerned the patient could have renal artery stenosis and will schedule renal artery duplex scan.  She will continue to monitor her blood pressure at home.  She has had episodes of hypotension and increased fatigue with carvedilol at 12.5 mg dosing.  In the past patient was on metoprolol succinate and this was changed by her primary care physician to carvedilol for unknown reason.  3. Hyperlipidemia.  Patient is currently taking simvastatin.  Lipid panel from 6/17/2019 reveals total cholesterol 202, triglycerides 112, , HDL 73.  4. Obstructive sleep apnea.  Compliant with CPAP machine  5. Anterior neck pain.  Unknown cause.  Patient did not have any noted lymphadenopathy on exam today.  Patient has not had carotid duplex imaging.   Will send patient for carotid ultrasound.  6. Follow-up with me for blood pressure recheck in 2 weeks and follow-up with Dr. Rao in 6 months.      Coronary Artery Disease  Assessment  • The patient has no angina    Plan  • Lifestyle modifications discussed include adhering to a heart healthy diet, avoidance of tobacco products, maintenance of a healthy weight, medication compliance, regular exercise and regular monitoring of cholesterol and blood pressure    Subjective - Objective  • Current antiplatelet therapy includes aspirin 81 mg          Thank you for allowing me to participate in the care of your patient,      Sincerely,   BALAJI Yee  [facility]  10/02/19  2:45 PM    **Celso Disclaimer:**  Much of this encounter note is an electronic transcription/translation of spoken language to printed text. The electronic translation of spoken language may permit erroneous, or at times, nonsensical words or phrases to be inadvertently transcribed. Although I have reviewed the note for such errors, some may still exist.

## 2019-10-09 ENCOUNTER — HOSPITAL ENCOUNTER (OUTPATIENT)
Dept: CARDIOLOGY | Facility: HOSPITAL | Age: 74
Discharge: HOME | End: 2019-10-09
Admitting: NURSE PRACTITIONER

## 2019-10-09 ENCOUNTER — HOSPITAL ENCOUNTER (OUTPATIENT)
Dept: CARDIOLOGY | Facility: HOSPITAL | Age: 74
Discharge: HOME | End: 2019-10-09

## 2019-10-09 DIAGNOSIS — I79.8 OTHER DISORDERS OF ARTERIES, ARTERIOLES AND CAPILLARIES IN DISEASES CLASSIFIED ELSEWHERE (HCC): ICD-10-CM

## 2019-10-09 DIAGNOSIS — M54.2 ANTERIOR NECK PAIN: ICD-10-CM

## 2019-10-09 DIAGNOSIS — I10 ESSENTIAL HYPERTENSION: ICD-10-CM

## 2019-10-09 LAB
BH CV ECHO MEAS - DIST REN A EDV LEFT: -24.1 CM/SEC
BH CV ECHO MEAS - DIST REN A PSV LEFT: -95.5 CM/SEC
BH CV ECHO MEAS - DIST REN A RI LEFT: 0.75
BH CV ECHO MEAS - MID REN A EDV LEFT: -18.9 CM/SEC
BH CV ECHO MEAS - MID REN A PSV LEFT: -81.7 CM/SEC
BH CV ECHO MEAS - MID REN A RI LEFT: 0.77
BH CV ECHO MEAS - PROX REN A EDV LEFT: -18.9 CM/SEC
BH CV ECHO MEAS - PROX REN A PSV LEFT: -86.9 CM/SEC
BH CV ECHO MEAS - PROX REN A RI LEFT: 0.78
BH CV VAS KIDNEY HEIGHT LEFT: 5.5 CM
BH CV VAS RENAL AORTIC MID PSV: 124 CM/S
BH CV XLRA MEAS - KID L LEFT: 9.7 CM
BH CV XLRA MEAS DIST REN A EDV RIGHT: -31 CM/SEC
BH CV XLRA MEAS DIST REN A PSV RIGHT: -111 CM/SEC
BH CV XLRA MEAS DIST REN A RI RIGHT: 0.72
BH CV XLRA MEAS KID H RIGHT: 4.5 CM
BH CV XLRA MEAS KID L RIGHT: 10.3 CM
BH CV XLRA MEAS LEFT DIST CCA EDV: 31.7 CM/SEC
BH CV XLRA MEAS LEFT DIST CCA PSV: 91.5 CM/SEC
BH CV XLRA MEAS LEFT DIST ICA EDV: -31.6 CM/SEC
BH CV XLRA MEAS LEFT DIST ICA PSV: -71.9 CM/SEC
BH CV XLRA MEAS LEFT ICA/CCA RATIO: 0.9
BH CV XLRA MEAS LEFT MID CCA EDV: 34.1 CM/SEC
BH CV XLRA MEAS LEFT MID CCA PSV: 100 CM/SEC
BH CV XLRA MEAS LEFT MID ICA EDV: -28 CM/SEC
BH CV XLRA MEAS LEFT MID ICA PSV: -85.4 CM/SEC
BH CV XLRA MEAS LEFT PROX CCA EDV: 23.2 CM/SEC
BH CV XLRA MEAS LEFT PROX CCA PSV: 103.7 CM/SEC
BH CV XLRA MEAS LEFT PROX ECA PSV: -68.9 CM/SEC
BH CV XLRA MEAS LEFT PROX ICA EDV: -29.3 CM/SEC
BH CV XLRA MEAS LEFT PROX ICA PSV: -78 CM/SEC
BH CV XLRA MEAS LEFT PROX SCLA PSV: 209.4 CM/SEC
BH CV XLRA MEAS LEFT VERTEBRAL A PSV: -66.5 CM/SEC
BH CV XLRA MEAS MID REN A EDV RIGHT: -26.7 CM/SEC
BH CV XLRA MEAS MID REN A PSV RIGHT: -114.4 CM/SEC
BH CV XLRA MEAS MID REN A RI RIGHT: 0.77
BH CV XLRA MEAS PROX REN A EDV RIGHT: 18.9 CM/SEC
BH CV XLRA MEAS PROX REN A PSV RIGHT: 93.8 CM/SEC
BH CV XLRA MEAS PROX REN A RI RIGHT: 0.8
BH CV XLRA MEAS RAR LEFT: 0.78
BH CV XLRA MEAS RAR RIGHT: 0.92
BH CV XLRA MEAS RIGHT DIST CCA EDV: 24.9 CM/SEC
BH CV XLRA MEAS RIGHT DIST CCA PSV: 78.3 CM/SEC
BH CV XLRA MEAS RIGHT DIST ICA EDV: -29.8 CM/SEC
BH CV XLRA MEAS RIGHT DIST ICA PSV: -81.4 CM/SEC
BH CV XLRA MEAS RIGHT ICA/CCA RATIO: 1
BH CV XLRA MEAS RIGHT MID CCA EDV: 24.2 CM/SEC
BH CV XLRA MEAS RIGHT MID CCA PSV: 77.7 CM/SEC
BH CV XLRA MEAS RIGHT MID ICA EDV: -21.7 CM/SEC
BH CV XLRA MEAS RIGHT MID ICA PSV: -73.3 CM/SEC
BH CV XLRA MEAS RIGHT PROX CCA EDV: 17.4 CM/SEC
BH CV XLRA MEAS RIGHT PROX CCA PSV: 74.6 CM/SEC
BH CV XLRA MEAS RIGHT PROX ECA PSV: 76.4 CM/SEC
BH CV XLRA MEAS RIGHT PROX ICA EDV: -16.2 CM/SEC
BH CV XLRA MEAS RIGHT PROX ICA PSV: -55.9 CM/SEC
BH CV XLRA MEAS RIGHT PROX SCLA PSV: 94.7 CM/SEC
BH CV XLRA MEAS RIGHT VERTEBRAL A PSV: -61.5 CM/SEC

## 2019-10-09 PROCEDURE — 93975 VASCULAR STUDY: CPT | Performed by: INTERNAL MEDICINE

## 2019-10-09 PROCEDURE — 93880 EXTRACRANIAL BILAT STUDY: CPT | Performed by: INTERNAL MEDICINE

## 2019-10-09 PROCEDURE — 93975 VASCULAR STUDY: CPT

## 2019-10-09 PROCEDURE — 93880 EXTRACRANIAL BILAT STUDY: CPT

## 2019-10-10 DIAGNOSIS — E04.1 THYROID NODULE: Primary | ICD-10-CM

## 2019-10-17 ENCOUNTER — HOSPITAL ENCOUNTER (OUTPATIENT)
Dept: ULTRASOUND IMAGING | Facility: HOSPITAL | Age: 74
Discharge: HOME | End: 2019-10-17
Admitting: INTERNAL MEDICINE

## 2019-10-17 DIAGNOSIS — E04.1 THYROID NODULE: ICD-10-CM

## 2019-10-17 PROCEDURE — 76536 US EXAM OF HEAD AND NECK: CPT

## 2019-10-22 DIAGNOSIS — E04.2 MULTIPLE THYROID NODULES: Primary | ICD-10-CM

## 2019-10-23 NOTE — PROGRESS NOTES
Called and spoke to the patient on 10/22/2019.  I am going to have her see Dr. Miller in ENT to ensure these do not require biopsy or need further work-up.  Patient was in agreement.    GM

## 2020-03-05 ENCOUNTER — TRANSCRIBE ORDERS (OUTPATIENT)
Dept: ADMINISTRATIVE | Facility: HOSPITAL | Age: 75
End: 2020-03-05

## 2020-03-05 DIAGNOSIS — E04.1 THYROID NODULE: Primary | ICD-10-CM

## 2020-04-27 ENCOUNTER — APPOINTMENT (OUTPATIENT)
Dept: ULTRASOUND IMAGING | Facility: HOSPITAL | Age: 75
End: 2020-04-27

## 2021-09-13 ENCOUNTER — TRANSCRIBE ORDERS (OUTPATIENT)
Dept: PHYSICAL THERAPY | Facility: CLINIC | Age: 76
End: 2021-09-13

## 2021-09-13 DIAGNOSIS — M54.16 RADICULOPATHY, LUMBAR REGION: Primary | ICD-10-CM

## 2021-09-13 DIAGNOSIS — M51.26 LUMBAR HERNIATED DISC: ICD-10-CM

## 2021-09-13 DIAGNOSIS — M48.061 SPINAL STENOSIS OF LUMBAR REGION, UNSPECIFIED WHETHER NEUROGENIC CLAUDICATION PRESENT: ICD-10-CM

## 2021-09-22 ENCOUNTER — TREATMENT (OUTPATIENT)
Dept: PHYSICAL THERAPY | Facility: CLINIC | Age: 76
End: 2021-09-22

## 2021-09-22 DIAGNOSIS — Z98.890 HISTORY OF LUMBAR SURGERY: ICD-10-CM

## 2021-09-22 DIAGNOSIS — R26.2 DIFFICULTY WALKING: ICD-10-CM

## 2021-09-22 DIAGNOSIS — M54.41 ACUTE BILATERAL LOW BACK PAIN WITH RIGHT-SIDED SCIATICA: Primary | ICD-10-CM

## 2021-09-22 PROCEDURE — 97530 THERAPEUTIC ACTIVITIES: CPT | Performed by: PHYSICAL THERAPIST

## 2021-09-22 PROCEDURE — 97162 PT EVAL MOD COMPLEX 30 MIN: CPT | Performed by: PHYSICAL THERAPIST

## 2021-09-22 PROCEDURE — 97110 THERAPEUTIC EXERCISES: CPT | Performed by: PHYSICAL THERAPIST

## 2021-09-22 NOTE — PROGRESS NOTES
Physical Therapy Initial Evaluation and Plan of Care    Patient: Anabelle Santiago   : 1945  Diagnosis/ICD-10 Code:  Acute bilateral low back pain with right-sided sciatica [M54.41]  Referring practitioner: Antwon Paredes MD  Past Medical History Reviewed: 2021    PLOF: Independent and likes to bowl    Subjective Evaluation    History of Present Illness  Date of surgery: 2021  Mechanism of injury: I had back surgery on  to clean the arthritis out of my back. Everything bothers my back right now. I use ice and heat back and forth and nothing helps my back pain. I bought a back brace and it helps a little in the car. I have a TENS unit, but I have been afraid to use. I am use a walker at night because of my right sided radicular pain. I use a cane during the day. No falls since back surgery. My radicular sx's are much better since surgery.   The left leg is good.   I have 2 hip replacements.       Patient Occupation: not working and likes to bowling Pain  Current pain ratin  At worst pain rating: 10  Location: (R) low back and right leg  Relieving factors: heat and ice  Aggravating factors: standing, ambulation and sleeping  Progression: improved    Social Support  Lives with: spouse             Objective          Palpation   Left   Tenderness of the erector spinae.     Right Tenderness of the erector spinae.     Neurological Testing     Sensation     Lumbar   Left   Intact: light touch    Right   Intact: light touch    Strength/Myotome Testing     Left Hip   Planes of Motion   Flexion: 3  Abduction: 3-  External rotation: 3  Internal rotation: 3    Right Hip   Planes of Motion   Flexion: 3  Abduction: 3-  External rotation: 3  Internal rotation: 3    Left Knee   Flexion: 3+  Extension: 3+    Right Knee   Flexion: 3+  Extension: 3+    Left Ankle/Foot   Dorsiflexion: 3+    Right Ankle/Foot   Dorsiflexion: 3+    Ambulation   Weight-Bearing Status   Assistive device used: single point  cane    Ambulation: Stairs     Additional Stairs Ambulation Details  Reported as difficult    Observational Gait   Gait: antalgic   Decreased walking speed and stride length.   Base of support: increased    Additional Observational Gait Details  1 LOB    Quality of Movement During Gait   Trunk    Trunk (Left): Positive left lateral lean over stance limb.   Trunk (Right): Positive lateral lean over stance limb.     Comments   TU sec with SPC          Assessment & Plan     Assessment  Impairments: abnormal coordination, abnormal gait, activity intolerance, impaired balance, impaired physical strength, lacks appropriate home exercise program and safety issue  Assessment details: Pt presents to PT with symptoms consistent with (B) LE weakness and core/lumbar weakness resulting in decreased ability to ambulate, climb stairs and sleep comfortably secondary to lumbar surgery.  Pt would benefit from skilled PT intervention to address the deficits noted.     Prognosis: good  Functional Limitations: sleeping, walking, uncomfortable because of pain, standing and stooping  Goals  Plan Goals: SHORT TERM GOALS:   2-3 weeks  1. Pt will be compliant with HEP.  2. Pt will have TUG score of 19 sec in order to decrease risk for falls.  3.  Pt to perform 5 x STS in 15 sec to demonstrate improve safety with positional changes.  4. Pt will demonstrate 4-/5 B LE strength in order to improve gait, transfers and stair climbing ability.      LONG TERM GOALS:  5-6 weeks  1.Pt will be able to ambulate for 1000 ft or greater with improve step length and heel strike in order to decrease fall risk.  2. Pt will be able to climb 5 steps due to improved strength.  3. Pt will be able to perform 7x sit-stand without use of UE due to improved strength.  4. Pt will be able to stand on foam for 10 sec or greater with eyes open due to improved balance.  5. Pt will improve TUG score to 12 sec or less due to improved gait mechanics, strength and  balance.        Plan  Therapy options: will be seen for skilled physical therapy services  Planned modality interventions: cryotherapy and thermotherapy (hydrocollator packs)  Planned therapy interventions: abdominal trunk stabilization, ADL retraining, body mechanics training, balance/weight-bearing training, flexibility, functional ROM exercises, gait training, home exercise program, neuromuscular re-education, motor coordination training, postural training, soft tissue mobilization, strengthening, stretching, therapeutic activities, transfer training and IADL retraining  Duration in visits: 16  Treatment plan discussed with: patient        Manual Therapy:    -     mins  32009;  Therapeutic Exercise:    10     mins  41724;     Neuromuscular Jojo:    -    mins  40830;    Therapeutic Activity:     14     mins  60798;     Gait Training:      -     mins  50837;     Ultrasound:     -     mins  51357;    Electrical Stimulation:    -     mins  73105 ( );  Dry Needling     -     mins self-pay    Timed Treatment:   24   mins   Total Treatment:     55   mins      PT SIGNATURE: JC Black license #: [id number]  DATE TREATMENT INITIATED: 9/22/2021    Medicare Initial Certification  Certification Period: 12/21/2021  I certify that the therapy services are furnished while this patient is under my care.  The services outlined above are required by this patient, and will be reviewed every 90 days.     PHYSICIAN: Antwon Paredes MD      DATE:     Please sign and return via fax to [phone].. Thank you, [facility].

## 2021-09-24 ENCOUNTER — TREATMENT (OUTPATIENT)
Dept: PHYSICAL THERAPY | Facility: CLINIC | Age: 76
End: 2021-09-24

## 2021-09-24 PROCEDURE — 97110 THERAPEUTIC EXERCISES: CPT | Performed by: PHYSICAL THERAPIST

## 2021-09-24 NOTE — PROGRESS NOTES
Physical Therapy Daily Progress Note  Visit # 2           Patient: Anabelle Santiago   : 1945  Diagnosis/ICD-10 Code:  No primary diagnosis found.  Referring practitioner: Antwon Paredes MD  Date of Initial Evaluation:  Type: THERAPY  Noted: 2021      Subjective  Anabelle Santiago reports:   Doing well with HEP issued at [facility].  Feeling good today.      Objective   See Exercise, Manual, and Modality Logs for complete treatment.   Reviewed use of TENS with pt.     Assessment/Plan  Tolerated continued progression of therapeutic exercise/therapeutic activity well today, no increased pain reported during or after exercises.    Would continue to benefit from skilled PT progressing with core stability and LE/hip strength.        Progress per Plan of Care and Progress strengthening /stabilization /functional activity           Timed:         Manual Therapy:         mins  57301     Therapeutic Exercise:     40    mins  35893     Neuromuscular Jojo:        mins  50791    Therapeutic Activity:          mins  28442     Gait Training:           mins  73534     Ultrasound:          mins  39746    Ionto                                   mins  64802  Self Care                            mins  88747    Un-Timed:  Electrical Stimulation:         mins 69061 ( )  Traction          mins 83921    Timed Treatment:   40   mins   Total Treatment:     53   mins    JUSTYNA Shepard License #[id number]  Physical Therapist Assistant

## 2021-11-16 ENCOUNTER — TELEPHONE (OUTPATIENT)
Dept: GASTROENTEROLOGY | Facility: CLINIC | Age: 76
End: 2021-11-16

## 2021-11-16 ENCOUNTER — OFFICE VISIT (OUTPATIENT)
Dept: GASTROENTEROLOGY | Facility: CLINIC | Age: 76
End: 2021-11-16

## 2021-11-16 VITALS — TEMPERATURE: 97.7 F | HEIGHT: 58 IN | WEIGHT: 140 LBS | BODY MASS INDEX: 29.39 KG/M2

## 2021-11-16 DIAGNOSIS — Z86.010 PERSONAL HISTORY OF COLONIC POLYPS: ICD-10-CM

## 2021-11-16 DIAGNOSIS — D50.8 OTHER IRON DEFICIENCY ANEMIA: Primary | ICD-10-CM

## 2021-11-16 DIAGNOSIS — R19.5 HEME + STOOL: ICD-10-CM

## 2021-11-16 DIAGNOSIS — K21.9 GASTROESOPHAGEAL REFLUX DISEASE, UNSPECIFIED WHETHER ESOPHAGITIS PRESENT: ICD-10-CM

## 2021-11-16 PROCEDURE — 99214 OFFICE O/P EST MOD 30 MIN: CPT | Performed by: NURSE PRACTITIONER

## 2021-11-16 RX ORDER — LANOLIN ALCOHOL/MO/W.PET/CERES
325 CREAM (GRAM) TOPICAL DAILY
COMMUNITY
Start: 2021-07-19 | End: 2022-01-15

## 2021-11-16 RX ORDER — TIZANIDINE 4 MG/1
1 TABLET ORAL DAILY
COMMUNITY
Start: 2021-08-30

## 2021-11-16 RX ORDER — ROSUVASTATIN CALCIUM 40 MG/1
40 TABLET, COATED ORAL DAILY
COMMUNITY

## 2021-11-16 RX ORDER — GLIMEPIRIDE 1 MG/1
1 TABLET ORAL DAILY
COMMUNITY
Start: 2021-09-17

## 2021-11-16 NOTE — TELEPHONE ENCOUNTER
cs, egd arrive at 1030 on 01/06 sw April- gave pt prep inst on 11/16 advised pt time is subject to change [facility] will call

## 2021-11-16 NOTE — PROGRESS NOTES
Chief Complaint   Patient presents with   • Anemia   • Positive fecal occult test       HPI    Anabelle Santiago is a  76 y.o. female here to establish care as a new patient for anemia and heme positive stool.    Patient will also follow with Dr. Diaz.    Her last hemoglobin 2 months ago was 11.    Visit today patient denies overt GI bleeding.  She has never had any episodes of melena or bright red blood per rectum.  Bowels move daily.  She takes MiraLAX 1 capful daily.  She is been on MiraLAX regimen for a number of years.  She is on Percocet for chronic pain.    No nausea, vomiting, dysphagia, odynophagia or appetite is good.  Weight is stable.  Takes omeprazole 40 mg once daily.  She is on iron supplement every other day for the last 2 months.  Denies history of pernicious anemia.    Patient reports having a colonoscopy 8 years ago with polyps removed.  Unavailable for review.    No family history of colon cancer.    Past Medical History:   Diagnosis Date   • Allergic    • Anemia    • Asthma    • CAD (coronary artery disease)     Cath on 10/22/15 by Dr. Stafford: Left main normal.  LAD with 50-60% ostial disease and 30% distal.  LCx large with 30% ostial and 50% distal disease.  OM1 30% proximal.  RCA dominant and normal.  FFR of distal left circumflex was normal.   • Diabetes mellitus (HCC)    • Hyperlipidemia    • Hypertension    • YAMILETH (obstructive sleep apnea)     on CPAP   • Osteoarthritis    • Recurrent UTI    • Shortness of breath    • Vasculitis (HCC)     Diagnosed at [facility] - history of vascultits of small vessels of hands and feet       Past Surgical History:   Procedure Laterality Date   • APPENDECTOMY     • BLADDER SURGERY      repair   • HYSTERECTOMY     • TONSILLECTOMY     • TOTAL HIP ARTHROPLASTY Bilateral        Scheduled Meds:     Continuous Infusions: No current facility-administered medications for this visit.      PRN Meds:     Allergies   Allergen Reactions   • Lipitor [Atorvastatin] Unknown  (See Comments)     Vasculitis, per patient report        Social History     Socioeconomic History   • Marital status:    Tobacco Use   • Smoking status: Passive Smoke Exposure - Never Smoker   • Smokeless tobacco: Never Used   • Tobacco comment: 23 years of secondhand smoke exposure   Vaping Use   • Vaping Use: Unknown   Substance and Sexual Activity   • Alcohol use: No   • Drug use: No   • Sexual activity: Defer       Family History   Problem Relation Age of Onset   • Stroke Mother         CVA   • Leukemia Father    • Coronary artery disease Brother         MI at 41   • Cancer Maternal Grandmother         colon cancer       Review of Systems   Constitutional: Negative for activity change, appetite change, fatigue and unexpected weight change.   HENT: Negative for trouble swallowing.    Eyes: Negative.    Respiratory: Negative.    Cardiovascular: Negative.    Gastrointestinal: Negative for abdominal distention, abdominal pain, anal bleeding, blood in stool, constipation, diarrhea, nausea, rectal pain and vomiting.   Endocrine: Negative.    Genitourinary: Negative.    Musculoskeletal: Negative.    Allergic/Immunologic: Negative.    Neurological: Negative.    Hematological: Negative.    Psychiatric/Behavioral: Negative.        Vitals:    11/16/21 0934   Temp: 97.7 °F (36.5 °C)       Physical Exam  Constitutional:       Appearance: She is well-developed.   Abdominal:      General: Bowel sounds are normal. There is no distension.      Palpations: Abdomen is soft. There is no mass.      Tenderness: There is no abdominal tenderness. There is no guarding.      Hernia: No hernia is present.   Skin:     General: Skin is warm and dry.      Capillary Refill: Capillary refill takes less than 2 seconds.   Neurological:      Mental Status: She is alert and oriented to person, place, and time.   Psychiatric:         Behavior: Behavior normal.     Assessment    Diagnoses and all orders for this visit:    1. Other iron  deficiency anemia (Primary)  -     CBC & Differential  -     Iron and TIBC  -     Vitamin B12 and Folate  -     Celiac Comprehensive Panel  -     Case Request; Standing  -     Case Request    2. Heme + stool  -     Case Request; Standing  -     Case Request    3. Gastroesophageal reflux disease, unspecified whether esophagitis present    4. Personal history of colonic polyps  -     Case Request; Standing  -     Case Request       Plan    Pleasant 76-year-old female seen today for heme positive stools and iron deficiency anemia.    Differential diagnosis for iron-deficiency anemia must include sources of occult gastrointestinal blood loss such as ulceration, AVM, inflammation, celiac disease, or mass/malignancy. We will proceed with bidirectional endoscopy for further evaluation at [facility] to be performed by Dr. Diaz.    Labs today as above to reassess anemia.    Continue PPI therapy.    Further recommendations will be made pending results of procedures.         BALAJI Silveira  [facility]  [address] [place] [address]  Office: [phone]

## 2021-11-17 LAB
BASOPHILS # BLD AUTO: 0 X10E3/UL (ref 0–0.2)
BASOPHILS NFR BLD AUTO: 0 %
ENDOMYSIUM IGA SER QL: NEGATIVE
EOSINOPHIL # BLD AUTO: 0.1 X10E3/UL (ref 0–0.4)
EOSINOPHIL NFR BLD AUTO: 3 %
ERYTHROCYTE [DISTWIDTH] IN BLOOD BY AUTOMATED COUNT: 12.3 % (ref 11.7–15.4)
FOLATE SERPL-MCNC: 17.5 NG/ML
GLIADIN PEPTIDE IGA SER-ACNC: 6 UNITS (ref 0–19)
GLIADIN PEPTIDE IGG SER-ACNC: 1 UNITS (ref 0–19)
HCT VFR BLD AUTO: 34.9 % (ref 34–46.6)
HGB BLD-MCNC: 11.2 G/DL (ref 11.1–15.9)
IGA SERPL-MCNC: 142 MG/DL (ref 64–422)
IMM GRANULOCYTES # BLD AUTO: 0 X10E3/UL (ref 0–0.1)
IMM GRANULOCYTES NFR BLD AUTO: 0 %
IRON SATN MFR SERPL: 20 % (ref 15–55)
IRON SERPL-MCNC: 64 UG/DL (ref 27–139)
LYMPHOCYTES # BLD AUTO: 1.4 X10E3/UL (ref 0.7–3.1)
LYMPHOCYTES NFR BLD AUTO: 31 %
MCH RBC QN AUTO: 30 PG (ref 26.6–33)
MCHC RBC AUTO-ENTMCNC: 32.1 G/DL (ref 31.5–35.7)
MCV RBC AUTO: 94 FL (ref 79–97)
MONOCYTES # BLD AUTO: 0.4 X10E3/UL (ref 0.1–0.9)
MONOCYTES NFR BLD AUTO: 8 %
NEUTROPHILS # BLD AUTO: 2.5 X10E3/UL (ref 1.4–7)
NEUTROPHILS NFR BLD AUTO: 58 %
PLATELET # BLD AUTO: 223 X10E3/UL (ref 150–450)
RBC # BLD AUTO: 3.73 X10E6/UL (ref 3.77–5.28)
TIBC SERPL-MCNC: 326 UG/DL (ref 250–450)
TTG IGA SER-ACNC: <2 U/ML (ref 0–3)
TTG IGG SER-ACNC: <2 U/ML (ref 0–5)
UIBC SERPL-MCNC: 262 UG/DL (ref 118–369)
VIT B12 SERPL-MCNC: 887 PG/ML (ref 232–1245)
WBC # BLD AUTO: 4.4 X10E3/UL (ref 3.4–10.8)

## 2021-11-18 ENCOUNTER — TELEPHONE (OUTPATIENT)
Dept: GASTROENTEROLOGY | Facility: CLINIC | Age: 76
End: 2021-11-18

## 2021-11-18 NOTE — TELEPHONE ENCOUNTER
----- Message from Sanjuana Jenkins PA-C sent at 11/18/2021  2:33 PM EST -----  Please let patient know that her labs are normal including no evidence of celiac disease, iron deficiency, B12 deficiency, folate deficiency.  I would still recommend she proceed with endoscopies given that she is overdue for colonoscopy with history of colon polyps.  And her hemoglobin is normal but borderline low.

## 2022-01-04 ENCOUNTER — LAB REQUISITION (OUTPATIENT)
Dept: LAB | Facility: HOSPITAL | Age: 77
End: 2022-01-04

## 2022-01-04 DIAGNOSIS — Z00.00 ENCOUNTER FOR GENERAL ADULT MEDICAL EXAMINATION WITHOUT ABNORMAL FINDINGS: ICD-10-CM

## 2022-01-04 LAB — SARS-COV-2 ORF1AB RESP QL NAA+PROBE: NOT DETECTED

## 2022-01-04 PROCEDURE — U0004 COV-19 TEST NON-CDC HGH THRU: HCPCS | Performed by: INTERNAL MEDICINE

## 2022-01-04 PROCEDURE — U0005 INFEC AGEN DETEC AMPLI PROBE: HCPCS | Performed by: INTERNAL MEDICINE

## 2022-01-06 ENCOUNTER — OUTSIDE FACILITY SERVICE (OUTPATIENT)
Dept: GASTROENTEROLOGY | Facility: CLINIC | Age: 77
End: 2022-01-06

## 2022-01-06 PROCEDURE — 43239 EGD BIOPSY SINGLE/MULTIPLE: CPT | Performed by: INTERNAL MEDICINE

## 2022-01-06 PROCEDURE — 45378 DIAGNOSTIC COLONOSCOPY: CPT | Performed by: INTERNAL MEDICINE

## 2022-01-14 NOTE — PROGRESS NOTES
Pathology benignNo further work-up as she is not anemic anymoreCheck CBC to confirm that she is still not anemic in 1 month

## 2022-01-17 ENCOUNTER — TELEPHONE (OUTPATIENT)
Dept: GASTROENTEROLOGY | Facility: CLINIC | Age: 77
End: 2022-01-17

## 2022-01-17 DIAGNOSIS — D64.9 ANEMIA, UNSPECIFIED TYPE: Primary | ICD-10-CM

## 2022-01-17 NOTE — TELEPHONE ENCOUNTER
Patient called. Advised as per Dr. Diaz's note. She verb understanding and will have the lab work drawn at her PCP office.

## 2022-01-17 NOTE — TELEPHONE ENCOUNTER
----- Message from Jigar Diaz MD sent at 1/14/2022  1:38 PM EST -----  Pathology benignNo further work-up as she is not anemic anymoreCheck CBC to confirm that she is still not anemic in 1 month

## 2023-12-06 ENCOUNTER — TELEPHONE (OUTPATIENT)
Dept: CARDIOLOGY | Facility: CLINIC | Age: 78
End: 2023-12-06
Payer: MEDICARE

## 2023-12-06 NOTE — TELEPHONE ENCOUNTER
Anabelle Santiago returned call.    Patient stated she has a new PCP and that her PCP (Dr. Kuhn) is wanting to know what medications Dr. Rao had her on to help control her BP.    Chart reviewed showed the following:      Reviewed with the following medications with the patient:  Carvedilol 6.25 mg BID  Enalapril 10 mg daily  Hydrochlorothiazide 12.5 mg daily    Thank you,  Kimberli BRADSHAW RN  Triage Nurse [facility]   14:01 EST

## 2023-12-06 NOTE — TELEPHONE ENCOUNTER
Pt called and left a VM asking about medications she takes (need to get more info from patient). I attempted to call her back and had to leave a VM. Will continue to try to reach her.    Thank you,    Briseyda Ulloa RN  Triage [facility]  12/06/23 12:54 EST

## 2024-05-30 ENCOUNTER — TRANSCRIBE ORDERS (OUTPATIENT)
Dept: ADMINISTRATIVE | Facility: HOSPITAL | Age: 79
End: 2024-05-30
Payer: MEDICARE

## 2024-05-30 ENCOUNTER — HOSPITAL ENCOUNTER (OUTPATIENT)
Dept: GENERAL RADIOLOGY | Facility: HOSPITAL | Age: 79
Discharge: HOME | End: 2024-05-30
Admitting: ANESTHESIOLOGY
Payer: MEDICARE

## 2024-05-30 DIAGNOSIS — M54.16 LUMBAR RADICULOPATHY: Primary | ICD-10-CM

## 2024-05-30 DIAGNOSIS — M54.16 LUMBAR RADICULOPATHY: ICD-10-CM

## 2024-05-30 PROCEDURE — 72114 X-RAY EXAM L-S SPINE BENDING: CPT

## 2024-08-09 ENCOUNTER — HOSPITAL ENCOUNTER (OUTPATIENT)
Facility: HOSPITAL | Age: 79
Discharge: HOME | End: 2024-08-09
Attending: STUDENT IN AN ORGANIZED HEALTH CARE EDUCATION/TRAINING PROGRAM | Admitting: STUDENT IN AN ORGANIZED HEALTH CARE EDUCATION/TRAINING PROGRAM
Payer: MEDICARE

## 2024-08-09 VITALS
DIASTOLIC BLOOD PRESSURE: 94 MMHG | OXYGEN SATURATION: 99 % | HEIGHT: 58 IN | RESPIRATION RATE: 16 BRPM | HEART RATE: 76 BPM | WEIGHT: 135 LBS | BODY MASS INDEX: 28.34 KG/M2 | SYSTOLIC BLOOD PRESSURE: 192 MMHG | TEMPERATURE: 98.1 F

## 2024-08-09 DIAGNOSIS — R42 DIZZINESS: Primary | ICD-10-CM

## 2024-08-09 PROCEDURE — G0463 HOSPITAL OUTPT CLINIC VISIT: HCPCS | Performed by: STUDENT IN AN ORGANIZED HEALTH CARE EDUCATION/TRAINING PROGRAM

## 2024-08-09 PROCEDURE — 99202 OFFICE O/P NEW SF 15 MIN: CPT | Performed by: STUDENT IN AN ORGANIZED HEALTH CARE EDUCATION/TRAINING PROGRAM

## 2024-08-09 RX ORDER — IPRATROPIUM BROMIDE 42 UG/1
1 SPRAY, METERED NASAL
COMMUNITY
Start: 2024-05-13

## 2024-08-09 RX ORDER — FLUTICASONE PROPIONATE AND SALMETEROL 250; 50 UG/1; UG/1
POWDER RESPIRATORY (INHALATION)
COMMUNITY
Start: 2024-08-01

## 2024-08-09 RX ORDER — OLMESARTAN MEDOXOMIL 5 MG/1
10 TABLET ORAL DAILY
COMMUNITY

## 2024-08-09 RX ORDER — AMOXICILLIN AND CLAVULANATE POTASSIUM 875; 125 MG/1; MG/1
TABLET, FILM COATED ORAL
COMMUNITY
Start: 2024-07-29

## 2024-08-09 RX ORDER — CEPHALEXIN 500 MG/1
CAPSULE ORAL
COMMUNITY
Start: 2024-07-24

## 2024-08-09 RX ORDER — CIPROFLOXACIN AND DEXAMETHASONE 3; 1 MG/ML; MG/ML
4 SUSPENSION/ DROPS AURICULAR (OTIC)
COMMUNITY
Start: 2024-07-29

## 2024-08-10 NOTE — FSED PROVIDER NOTE
EMERGENCY DEPARTMENT ENCOUNTER    Room Number:  08/08  Date seen:  8/9/2024  Time seen: 20:40 EDT  PCP: Hattie Davis DO        HPI:    78-year-old female presents to the ER with complaints of dizziness for the last 2 months.  Patient followed up with her PCP who noted that she had trauma to her left ear after putting a Q-tip in there had been noted so blood in the right auditory canal.  Patient was given Augmentin and Ciprodex.  Patient (medications, no change in her dizziness.  No other nausea, vomiting, or headache.  No history of brain mass.    PAST MEDICAL HISTORY  Active Ambulatory Problems     Diagnosis Date Noted    Coronary artery disease involving native coronary artery of native heart without angina pectoris 09/26/2018    Prediabetes 09/26/2018    Essential hypertension 09/26/2018    Hyperlipidemia 09/26/2018    Persistent asthma 09/26/2018    YAMILETH (obstructive sleep apnea) 03/25/2019    Dementia 10/02/2019    Dyslipidemia 10/08/2018     Resolved Ambulatory Problems     Diagnosis Date Noted    No Resolved Ambulatory Problems     Past Medical History:   Diagnosis Date    Allergic     Anemia     Asthma     CAD (coronary artery disease)     Diabetes mellitus     Hypertension     Osteoarthritis     Recurrent UTI     Shortness of breath     Vasculitis          PAST SURGICAL HISTORY  Past Surgical History:   Procedure Laterality Date    APPENDECTOMY      BLADDER SURGERY      repair    HYSTERECTOMY      TONSILLECTOMY      TOTAL HIP ARTHROPLASTY Bilateral          FAMILY HISTORY  Family History   Problem Relation Age of Onset    Stroke Mother         CVA    Leukemia Father     Coronary artery disease Brother         MI at 41    Cancer Maternal Grandmother         colon cancer         SOCIAL HISTORY  Social History     Socioeconomic History    Marital status:    Tobacco Use    Smoking status: Passive Smoke Exposure - Never Smoker    Smokeless tobacco: Never    Tobacco comments:     23 years of secondhand  smoke exposure   Vaping Use    Vaping status: Unknown   Substance and Sexual Activity    Alcohol use: No    Drug use: No    Sexual activity: Defer         ALLERGIES  Lipitor [atorvastatin]        REVIEW OF SYSTEMS    All systems reviewed and negative except for those discussed in HPI.       PHYSICAL EXAM  ED Triage Vitals [08/09/24 1831]   Temp Heart Rate Resp BP SpO2   98.1 °F (36.7 °C) 76 16 (!) 192/94 99 %      Temp src Heart Rate Source Patient Position BP Location FiO2 (%)   -- -- -- -- --       Vital signs and nursing notes reviewed.  GENERAL: Nontoxic.  CV: Normal perfusion.   RESPIRATORY: No respiratory distress.   ABDOMEN: Soft.   ENT: Left TM clear, right TM shows streak of erythema with several submillimeter hypopigmented circular lesions.  No active bleed, no global erythema or bulging.  Neuro: Cranial nerves II through XII grossly intact.  Speech is fluid, gross motor hand exam intact.  No asymmetric weakness.  No ataxia, EOMI, PERRLA    LAB RESULTS  No results found for this or any previous visit (from the past 24 hour(s)).    Ordered the above labs and reviewed the results.        RADIOLOGY  No Radiology Exams Resulted Within Past 24 Hours    I ordered the above noted radiological studies. Reviewed by me and discussed with radiologist.  See dictation for official radiology interpretation.        PROCEDURES  Procedures      MEDICATIONS GIVEN IN ER  Medications - No data to display      MEDICATIONS GIVEN IN ER    Medications - No data to display      PROGRESS, DATA ANALYSIS, CONSULTS, AND MEDICAL DECISION MAKING    All labs have been independently reviewed by me.  All radiology studies have been reviewed by me and discussed with radiologist dictating the report.   EKG's independently viewed and interpreted by me.  Discussion below represents my analysis of pertinent findings related to patient's condition, differential diagnosis, treatment plan and final disposition.           AS OF 20:40 EDT  VITALS:    BP - (!) 192/94  HR - 76  TEMP - 98.1 °F (36.7 °C)  02 SATS - 99%      MDM    Patient evaluated for 2 months of dizziness, possible ear condition.  Vital signs stable.  Exam shows peculiar streak of erythema to right TM, no perforation    Differential: Labyrinthitis, BPPV, otitis media sequelae, vestibular neuritis, Ménière's disease, doubt cerebellar stroke    Unclear etiology of findings and right TM.  Does not appear emergent nature.  Recommend follow-up with ENT specialist/balance center.  Patient is to call her PCP tomorrow to get referral.    DIAGNOSIS  Final diagnoses:   Dizziness         DISPOSITION  dc

## 2024-08-10 NOTE — DISCHARGE INSTRUCTIONS
Call your PCP tomorrow to get a referral to ENT specialist or a balance center for further evaluation.    Return to the ER if you have further questions, concerns, or worsening symptoms.

## 2025-07-28 ENCOUNTER — APPOINTMENT (OUTPATIENT)
Dept: GENERAL RADIOLOGY | Facility: HOSPITAL | Age: 80
End: 2025-07-28
Payer: MEDICARE

## 2025-07-28 ENCOUNTER — HOSPITAL ENCOUNTER (EMERGENCY)
Facility: HOSPITAL | Age: 80
Discharge: ANOTHER HOSPITAL | End: 2025-07-28
Attending: EMERGENCY MEDICINE | Admitting: EMERGENCY MEDICINE
Payer: MEDICARE

## 2025-07-28 ENCOUNTER — APPOINTMENT (OUTPATIENT)
Dept: CT IMAGING | Facility: HOSPITAL | Age: 80
End: 2025-07-28
Payer: MEDICARE

## 2025-07-28 VITALS
RESPIRATION RATE: 17 BRPM | BODY MASS INDEX: 30.44 KG/M2 | TEMPERATURE: 97.8 F | OXYGEN SATURATION: 95 % | DIASTOLIC BLOOD PRESSURE: 79 MMHG | SYSTOLIC BLOOD PRESSURE: 165 MMHG | HEART RATE: 84 BPM | WEIGHT: 145 LBS | HEIGHT: 58 IN

## 2025-07-28 DIAGNOSIS — S01.81XA FACIAL LACERATION, INITIAL ENCOUNTER: ICD-10-CM

## 2025-07-28 DIAGNOSIS — S12.100A CLOSED ODONTOID FRACTURE, INITIAL ENCOUNTER: ICD-10-CM

## 2025-07-28 DIAGNOSIS — S12.01XA CLOSED JEFFERSON FRACTURE, INITIAL ENCOUNTER: Primary | ICD-10-CM
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
## 2025-07-28 LAB
ALBUMIN SERPL-MCNC: 4.1 G/DL (ref 3.5–5.2)
ALBUMIN/GLOB SERPL: 1.9 G/DL
ALP SERPL-CCNC: 101 U/L (ref 39–117)
ALT SERPL W P-5'-P-CCNC: 44 U/L (ref 1–33)
ANION GAP SERPL CALCULATED.3IONS-SCNC: 9.4 MMOL/L (ref 5–15)
AST SERPL-CCNC: 35 U/L (ref 1–32)
BASOPHILS # BLD AUTO: 0.01 10*3/MM3 (ref 0–0.2)
BASOPHILS NFR BLD AUTO: 0.2 % (ref 0–1.5)
BILIRUB SERPL-MCNC: 0.6 MG/DL (ref 0–1.2)
BUN SERPL-MCNC: 22.9 MG/DL (ref 8–23)
BUN/CREAT SERPL: 23.9 (ref 7–25)
CALCIUM SPEC-SCNC: 9.4 MG/DL (ref 8.6–10.5)
CHLORIDE SERPL-SCNC: 97 MMOL/L (ref 98–107)
CO2 SERPL-SCNC: 27.6 MMOL/L (ref 22–29)
CREAT SERPL-MCNC: 0.96 MG/DL (ref 0.57–1)
DEPRECATED RDW RBC AUTO: 51.1 FL (ref 37–54)
EGFRCR SERPLBLD CKD-EPI 2021: 61.1 ML/MIN/1.73
EOSINOPHIL # BLD AUTO: 0.18 10*3/MM3 (ref 0–0.4)
EOSINOPHIL NFR BLD AUTO: 2.8 % (ref 0.3–6.2)
ERYTHROCYTE [DISTWIDTH] IN BLOOD BY AUTOMATED COUNT: 13.8 % (ref 12.3–15.4)
GLOBULIN UR ELPH-MCNC: 2.2 GM/DL
GLUCOSE SERPL-MCNC: 114 MG/DL (ref 65–99)
HCT VFR BLD AUTO: 34.8 % (ref 34–46.6)
HGB BLD-MCNC: 11 G/DL (ref 12–15.9)
IMM GRANULOCYTES # BLD AUTO: 0.01 10*3/MM3 (ref 0–0.05)
IMM GRANULOCYTES NFR BLD AUTO: 0.2 % (ref 0–0.5)
INR PPP: 0.8
LYMPHOCYTES # BLD AUTO: 1.51 10*3/MM3 (ref 0.7–3.1)
LYMPHOCYTES NFR BLD AUTO: 23.7 % (ref 19.6–45.3)
MCH RBC QN AUTO: 31.4 PG (ref 26.6–33)
MCHC RBC AUTO-ENTMCNC: 31.6 G/DL (ref 31.5–35.7)
MCV RBC AUTO: 99.4 FL (ref 79–97)
MONOCYTES # BLD AUTO: 0.49 10*3/MM3 (ref 0.1–0.9)
MONOCYTES NFR BLD AUTO: 7.7 % (ref 5–12)
NEUTROPHILS NFR BLD AUTO: 4.18 10*3/MM3 (ref 1.7–7)
NEUTROPHILS NFR BLD AUTO: 65.4 % (ref 42.7–76)
PLATELET # BLD AUTO: 142 10*3/MM3 (ref 140–450)
PMV BLD AUTO: 8.9 FL (ref 6–12)
POTASSIUM SERPL-SCNC: 3.5 MMOL/L (ref 3.5–5.2)
PROT SERPL-MCNC: 6.3 G/DL (ref 6–8.5)
PROTHROMBIN TIME: 10.4 SECONDS (ref 11–15)
RBC # BLD AUTO: 3.5 10*6/MM3 (ref 3.77–5.28)
SODIUM SERPL-SCNC: 134 MMOL/L (ref 136–145)
WBC NRBC COR # BLD AUTO: 6.38 10*3/MM3 (ref 3.4–10.8)

## 2025-07-28 PROCEDURE — 85025 COMPLETE CBC W/AUTO DIFF WBC: CPT | Performed by: EMERGENCY MEDICINE

## 2025-07-28 PROCEDURE — 90715 TDAP VACCINE 7 YRS/> IM: CPT | Performed by: EMERGENCY MEDICINE

## 2025-07-28 PROCEDURE — 73080 X-RAY EXAM OF ELBOW: CPT

## 2025-07-28 PROCEDURE — 70486 CT MAXILLOFACIAL W/O DYE: CPT

## 2025-07-28 PROCEDURE — 73562 X-RAY EXAM OF KNEE 3: CPT

## 2025-07-28 PROCEDURE — 25810000003 SODIUM CHLORIDE 0.9 % SOLUTION: Performed by: EMERGENCY MEDICINE

## 2025-07-28 PROCEDURE — 99285 EMERGENCY DEPT VISIT HI MDM: CPT

## 2025-07-28 PROCEDURE — 80053 COMPREHEN METABOLIC PANEL: CPT | Performed by: EMERGENCY MEDICINE

## 2025-07-28 PROCEDURE — 85610 PROTHROMBIN TIME: CPT

## 2025-07-28 PROCEDURE — 72131 CT LUMBAR SPINE W/O DYE: CPT

## 2025-07-28 PROCEDURE — 96374 THER/PROPH/DIAG INJ IV PUSH: CPT

## 2025-07-28 PROCEDURE — 96376 TX/PRO/DX INJ SAME DRUG ADON: CPT

## 2025-07-28 PROCEDURE — 72128 CT CHEST SPINE W/O DYE: CPT

## 2025-07-28 PROCEDURE — 73090 X-RAY EXAM OF FOREARM: CPT

## 2025-07-28 PROCEDURE — 96361 HYDRATE IV INFUSION ADD-ON: CPT

## 2025-07-28 PROCEDURE — 25510000001 IOPAMIDOL PER 1 ML: Performed by: EMERGENCY MEDICINE

## 2025-07-28 PROCEDURE — 70498 CT ANGIOGRAPHY NECK: CPT

## 2025-07-28 PROCEDURE — 70450 CT HEAD/BRAIN W/O DYE: CPT

## 2025-07-28 PROCEDURE — 71045 X-RAY EXAM CHEST 1 VIEW: CPT

## 2025-07-28 PROCEDURE — 25010000002 LIDOCAINE 1% - EPINEPHRINE 1:100000 1 %-1:100000 SOLUTION: Performed by: EMERGENCY MEDICINE

## 2025-07-28 PROCEDURE — 90471 IMMUNIZATION ADMIN: CPT | Performed by: EMERGENCY MEDICINE

## 2025-07-28 PROCEDURE — 25010000002 TETANUS-DIPHTH-ACELL PERTUSSIS 5-2.5-18.5 LF-MCG/0.5 SUSPENSION PREFILLED SYRINGE: Performed by: EMERGENCY MEDICINE

## 2025-07-28 PROCEDURE — 72125 CT NECK SPINE W/O DYE: CPT

## 2025-07-28 PROCEDURE — 25010000002 FENTANYL CITRATE (PF) 50 MCG/ML SOLUTION: Performed by: EMERGENCY MEDICINE

## 2025-07-28 RX ORDER — FENTANYL CITRATE 50 UG/ML
50 INJECTION, SOLUTION INTRAMUSCULAR; INTRAVENOUS ONCE
Refills: 0 | Status: COMPLETED | OUTPATIENT
Start: 2025-07-28 | End: 2025-07-28

## 2025-07-28 RX ORDER — DIAPER,BRIEF,INFANT-TODD,DISP
1 EACH MISCELLANEOUS ONCE
Status: COMPLETED | OUTPATIENT
Start: 2025-07-28 | End: 2025-07-28

## 2025-07-28 RX ORDER — LIDOCAINE HYDROCHLORIDE AND EPINEPHRINE 10; 10 MG/ML; UG/ML
10 INJECTION, SOLUTION INFILTRATION; PERINEURAL ONCE
Status: COMPLETED | OUTPATIENT
Start: 2025-07-28 | End: 2025-07-28

## 2025-07-28 RX ORDER — IOPAMIDOL 755 MG/ML
100 INJECTION, SOLUTION INTRAVASCULAR
Status: COMPLETED | OUTPATIENT
Start: 2025-07-28 | End: 2025-07-28

## 2025-07-28 RX ORDER — SODIUM CHLORIDE 9 MG/ML
75 INJECTION, SOLUTION INTRAVENOUS CONTINUOUS
Status: DISCONTINUED | OUTPATIENT
Start: 2025-07-28 | End: 2025-07-29 | Stop reason: HOSPADM

## 2025-07-28 RX ADMIN — TETANUS TOXOID, REDUCED DIPHTHERIA TOXOID AND ACELLULAR PERTUSSIS VACCINE, ADSORBED 0.5 ML: 5; 2.5; 8; 8; 2.5 SUSPENSION INTRAMUSCULAR at 21:57

## 2025-07-28 RX ADMIN — FENTANYL CITRATE 50 MCG: 50 INJECTION, SOLUTION INTRAMUSCULAR; INTRAVENOUS at 21:55

## 2025-07-28 RX ADMIN — FENTANYL CITRATE 50 MCG: 50 INJECTION, SOLUTION INTRAMUSCULAR; INTRAVENOUS at 22:55

## 2025-07-28 RX ADMIN — IOPAMIDOL 95 ML: 755 INJECTION, SOLUTION INTRAVENOUS at 22:41

## 2025-07-28 RX ADMIN — BACITRACIN 0.9 G: 500 OINTMENT TOPICAL at 21:56

## 2025-07-28 RX ADMIN — SODIUM CHLORIDE 75 ML/HR: 9 INJECTION, SOLUTION INTRAVENOUS at 22:05

## 2025-07-28 RX ADMIN — LIDOCAINE HYDROCHLORIDE AND EPINEPHRINE 10 ML: 10; 10 INJECTION, SOLUTION INFILTRATION; PERINEURAL at 21:02

## 2025-07-29 NOTE — FSED PROVIDER NOTE
Subjective   History of Present Illness  78yo female pmh significant htn/hyperlipidemia/dm2/stone/asthma presents ED s/p mechanical ground level fall striking head on pavement.  Pt c/o forehead laceration/facial abrasions/bilateral knee contusions/neck pain.  Denies loc/vomiting/chest pain/soa/abd pain/back pain.    History provided by:  Patient  Fall  Associated symptoms: neck pain        Review of Systems   HENT: Negative.     Eyes: Negative.    Respiratory: Negative.     Cardiovascular: Negative.    Gastrointestinal: Negative.    Musculoskeletal:  Positive for arthralgias and neck pain.   Skin:  Positive for wound.   Allergic/Immunologic: Negative for immunocompromised state.   Neurological: Negative.    All other systems reviewed and are negative.      Past Medical History:   Diagnosis Date    Allergic     Anemia     Asthma     CAD (coronary artery disease)     Cath on 10/22/15 by Dr. Stafford: Left main normal.  LAD with 50-60% ostial disease and 30% distal.  LCx large with 30% ostial and 50% distal disease.  OM1 30% proximal.  RCA dominant and normal.  FFR of distal left circumflex was normal.    Diabetes mellitus     Hyperlipidemia     Hypertension     STONE (obstructive sleep apnea)     on CPAP    Osteoarthritis     Recurrent UTI     Shortness of breath     Vasculitis     Diagnosed at [facility] - history of vascultits of small vessels of hands and feet       Allergies   Allergen Reactions    Atorvastatin Other (See Comments) and Unknown (See Comments)     Vasculitis, per patient report    Vasculitis, per patient report       Other reaction(s): Unknown (See Comments) Vasculitis, per patient report    Vasculitis, per patient report       Vasculitis, per patient report   Other reaction(s): Unknown (See Comments) Vasculitis, per patient report      Other reaction(s): Unknown (See Comments) Vasculitis, per patient report    Chlorthalidone Other (See Comments)     Electrolyte derangement    Enalapril Other (See  "Comments)     Electrolyte derangement     Electrolyte derangement    Metformin Diarrhea     Severe diarrhea    Amlodipine Other (See Comments)     Pt refused to continue due to \"bruising.\"       Past Surgical History:   Procedure Laterality Date    APPENDECTOMY      BLADDER SURGERY      repair    HYSTERECTOMY      TONSILLECTOMY      TOTAL HIP ARTHROPLASTY Bilateral        Family History   Problem Relation Age of Onset    Stroke Mother         CVA    Leukemia Father     Coronary artery disease Brother         MI at 41    Cancer Maternal Grandmother         colon cancer       Social History     Socioeconomic History    Marital status:    Tobacco Use    Smoking status: Passive Smoke Exposure - Never Smoker    Smokeless tobacco: Never    Tobacco comments:     23 years of secondhand smoke exposure   Vaping Use    Vaping status: Never Used   Substance and Sexual Activity    Alcohol use: No    Drug use: No    Sexual activity: Defer           Objective   Physical Exam  Vitals and nursing note reviewed.   Constitutional:       Appearance: She is not toxic-appearing or diaphoretic.   HENT:      Head: Normocephalic. Abrasion, contusion and laceration present. No raccoon eyes or Sheets's sign.      Jaw: There is normal jaw occlusion.        Right Ear: Tympanic membrane, ear canal and external ear normal. No hemotympanum.      Left Ear: Tympanic membrane, ear canal and external ear normal. No hemotympanum.      Nose: Nose normal.      Mouth/Throat:      Mouth: Mucous membranes are moist.      Pharynx: Oropharynx is clear.   Eyes:      Extraocular Movements: Extraocular movements intact.      Pupils: Pupils are equal, round, and reactive to light.   Neck:      Trachea: Trachea and phonation normal.        Comments: Cervical collar in place  Cardiovascular:      Rate and Rhythm: Normal rate and regular rhythm.      Pulses: Normal pulses.      Heart sounds: Normal heart sounds. No murmur heard.     No friction rub. No " gallop.   Pulmonary:      Effort: Pulmonary effort is normal.      Breath sounds: Normal breath sounds. No wheezing, rhonchi or rales.   Abdominal:      General: Abdomen is flat. Bowel sounds are normal. There is no distension.      Palpations: Abdomen is soft.      Tenderness: There is no abdominal tenderness. There is no guarding or rebound.   Musculoskeletal:         General: No swelling or deformity.      Right elbow: No deformity, effusion or lacerations. Normal range of motion. No tenderness.      Right forearm: No edema, deformity, lacerations, tenderness or bony tenderness.        Arms:       Cervical back: Neck supple. Tenderness present. Spinous process tenderness present.      Thoracic back: Normal. No deformity, tenderness or bony tenderness.      Lumbar back: Normal. No deformity, tenderness or bony tenderness.      Right knee: No swelling, deformity, effusion or bony tenderness. No tenderness. No LCL laxity, MCL laxity or ACL laxity. Normal pulse.      Left knee: No swelling, deformity, effusion or bony tenderness. No tenderness. No LCL laxity, MCL laxity or ACL laxity.Normal pulse.      Right lower leg: No edema.      Left lower leg: No edema.        Legs:       Comments: Pelvis: nontender AP compression. Negative deformity   Lymphadenopathy:      Cervical: No cervical adenopathy.   Skin:     General: Skin is warm and dry.   Neurological:      General: No focal deficit present.      Mental Status: She is alert and oriented to person, place, and time.      GCS: GCS eye subscore is 4. GCS verbal subscore is 5. GCS motor subscore is 6.         Laceration Repair    Date/Time: 7/28/2025 9:28 PM    Performed by: Ken Hartley MD  Authorized by: Ken Hartley MD    Consent:     Consent obtained:  Verbal    Consent given by:  Patient  Universal protocol:     Patient identity confirmed:  Verbally with patient  Laceration details:     Location:  Face    Face location:  Forehead    Length (cm):   2  Pre-procedure details:     Preparation:  Patient was prepped and draped in usual sterile fashion  Exploration:     Contaminated: no    Treatment:     Area cleansed with:  Chlorhexidine and saline    Amount of cleaning:  Standard    Irrigation solution:  Sterile saline  Skin repair:     Repair method:  Sutures    Suture size:  5-0    Suture material:  Prolene    Suture technique:  Simple interrupted    Number of sutures:  5  Approximation:     Approximation:  Close  Repair type:     Repair type:  Simple  Post-procedure details:     Dressing:  Antibiotic ointment and bulky dressing    Procedure completion:  Tolerated well, no immediate complications             ED Course  ED Course as of 07/28/25 2140 Mon Jul 28, 2025 2130 [facility] Transfer contacted [SD]   2138 D/w Dr. Livingston, [facility] JUST[facility]  D/w Dr. Rahman [facility] ER accepting patient transfer.  Pt stable transport. [SD]      ED Course User Index  [SD] Ken Hartley MD      CT Head Without Contrast  CT Head Without Contrast, CT Facial Bones Without Contrast  CT Head Without Contrast, CT Facial Bones Without Contrast, CT Cervical Spine Without Contrast  Result Date: 7/28/2025  Narrative: CT HEAD & CT CERVICAL SPINE & CT MAXILLOFACIAL WITHOUT CONTRAST   REASON:  The patient is being seen in the ED for trauma and is determined to have a high energy mechanism and/or high risk for missed injuries due to presence of associated injuries or distracting pain. The patient will need imaging of the head per the trauma protocol given diagnoses such as intracranial hemorrhage cannot be excluded.  The patient will need imaging of the cervical spine given diagnoses such as cervical spine fracture cannot be excluded. The patient will need imaging of the face given diagnoses such as facial fracture cannot be excluded clinically. The diagnostic information gained in this study exceeds the estimated risk of radiation induced injury at the time of order placement. Mechanism of injury:  Fall  COMPARISON STUDIES:  None Available.  TECHNIQUE:  Axial images were acquired from the skull base to vertex without contrast, including multiplanar reformats, per standard departmental protocol.   Routine cervical spine CT without contrast. Multiplanar reformats were created.   Axial maxillofacial CT without IV contrast, with multiplanar reformats.    Radiation dose reduction techniques were utilized, including automated exposure control, and exposure modulation based on body size.   FINDINGS:  Head CT: There is no CT evidence of acute intracranial hemorrhage, mass, or infarct. There is volume loss, but there is no evidence of hydrocephalus or extra-axial fluid collection.  Brain parenchymal density is within normal limits. There is frontal scalp soft tissue swelling but there is no acute bony abnormality.  C-spine CT: There is no acute alignment abnormality. There has been prior anterior cervical fusion from C4-C7. There is a mildly displaced 3 part C1 Yao fracture, and there is a mildly displaced type II dens fracture. No other acute bony abnormality is seen.  Maxillofacial CT: There is no evidence of facial bone fracture. There is frontal/supraorbital scalp soft tissue swelling. The orbital soft tissues are normal. No evidence of orbital hematoma, foreign body or ocular injury. There is no radiopaque foreign body.      Impression:  Negative unenhanced head CT, no acute intracranial abnormality.  Abnormal cervical spine CT with mildly displaced C1 Yao fracture and a mildly displaced type II dens fracture.  Negative maxillofacial CT, no evidence of fracture, no evidence of orbital or ocular injury.  CALL REPORT :  Results of the exam were discussed with Dr. Hartley, the physician caring for the patient in the emergency room at the time of this dictation.     This report was finalized on 7/28/2025 9:03 PM by Dr. Wilfrido Fuentes M.D on Workstation: [id number]                                            Medical Decision Making  Problems Addressed:  Closed Yao fracture, initial encounter: complicated acute illness or injury  Closed odontoid fracture, initial encounter: complicated acute illness or injury  Facial laceration, initial encounter: complicated acute illness or injury  Fall, initial encounter: complicated acute illness or injury    Amount and/or Complexity of Data Reviewed  Labs: ordered.  Radiology: ordered.    Risk  OTC drugs.  Prescription drug management.        Final diagnoses:   Closed Yao fracture, initial encounter   Closed odontoid fracture, initial encounter   Facial laceration, initial encounter   Fall, initial encounter       ED Disposition  ED Disposition       ED Disposition   Transfer to Another Facility     Condition   --    Comment   --               No follow-up provider specified.       Medication List      No changes were made to your prescriptions during this visit.